# Patient Record
Sex: FEMALE | Race: WHITE | NOT HISPANIC OR LATINO | URBAN - METROPOLITAN AREA
[De-identification: names, ages, dates, MRNs, and addresses within clinical notes are randomized per-mention and may not be internally consistent; named-entity substitution may affect disease eponyms.]

---

## 2018-11-05 ENCOUNTER — EMERGENCY (EMERGENCY)
Facility: HOSPITAL | Age: 83
LOS: 1 days | Discharge: HOME | End: 2018-11-05
Attending: EMERGENCY MEDICINE
Payer: COMMERCIAL

## 2018-11-05 VITALS
SYSTOLIC BLOOD PRESSURE: 155 MMHG | TEMPERATURE: 98 F | RESPIRATION RATE: 20 BRPM | DIASTOLIC BLOOD PRESSURE: 71 MMHG | HEART RATE: 85 BPM | OXYGEN SATURATION: 97 %

## 2018-11-05 VITALS
WEIGHT: 125 LBS | OXYGEN SATURATION: 96 % | HEIGHT: 63 IN | TEMPERATURE: 98 F | SYSTOLIC BLOOD PRESSURE: 182 MMHG | HEART RATE: 110 BPM | DIASTOLIC BLOOD PRESSURE: 84 MMHG | RESPIRATION RATE: 20 BRPM

## 2018-11-05 LAB
ALBUMIN SERPL ELPH-MCNC: 4.2 G/DL — SIGNIFICANT CHANGE UP (ref 3.3–5.2)
ALP SERPL-CCNC: 59 U/L — SIGNIFICANT CHANGE UP (ref 40–120)
ALT FLD-CCNC: 10 U/L — SIGNIFICANT CHANGE UP
ANION GAP SERPL CALC-SCNC: 14 MMOL/L — SIGNIFICANT CHANGE UP (ref 5–17)
APPEARANCE UR: CLEAR — SIGNIFICANT CHANGE UP
AST SERPL-CCNC: 20 U/L — SIGNIFICANT CHANGE UP
BASOPHILS # BLD AUTO: 0 K/UL — SIGNIFICANT CHANGE UP (ref 0–0.2)
BASOPHILS NFR BLD AUTO: 0.5 % — SIGNIFICANT CHANGE UP (ref 0–2)
BILIRUB SERPL-MCNC: 0.5 MG/DL — SIGNIFICANT CHANGE UP (ref 0.4–2)
BILIRUB UR-MCNC: NEGATIVE — SIGNIFICANT CHANGE UP
BUN SERPL-MCNC: 20 MG/DL — SIGNIFICANT CHANGE UP (ref 8–20)
CALCIUM SERPL-MCNC: 10.5 MG/DL — HIGH (ref 8.6–10.2)
CHLORIDE SERPL-SCNC: 103 MMOL/L — SIGNIFICANT CHANGE UP (ref 98–107)
CO2 SERPL-SCNC: 27 MMOL/L — SIGNIFICANT CHANGE UP (ref 22–29)
COLOR SPEC: YELLOW — SIGNIFICANT CHANGE UP
CREAT SERPL-MCNC: 1.26 MG/DL — SIGNIFICANT CHANGE UP (ref 0.5–1.3)
DIFF PNL FLD: NEGATIVE — SIGNIFICANT CHANGE UP
EOSINOPHIL # BLD AUTO: 0 K/UL — SIGNIFICANT CHANGE UP (ref 0–0.5)
EOSINOPHIL NFR BLD AUTO: 0.5 % — SIGNIFICANT CHANGE UP (ref 0–6)
GLUCOSE SERPL-MCNC: 135 MG/DL — HIGH (ref 70–115)
GLUCOSE UR QL: NEGATIVE MG/DL — SIGNIFICANT CHANGE UP
HCT VFR BLD CALC: 37.8 % — SIGNIFICANT CHANGE UP (ref 37–47)
HGB BLD-MCNC: 12.8 G/DL — SIGNIFICANT CHANGE UP (ref 12–16)
KETONES UR-MCNC: NEGATIVE — SIGNIFICANT CHANGE UP
LEUKOCYTE ESTERASE UR-ACNC: NEGATIVE — SIGNIFICANT CHANGE UP
LYMPHOCYTES # BLD AUTO: 1.7 K/UL — SIGNIFICANT CHANGE UP (ref 1–4.8)
LYMPHOCYTES # BLD AUTO: 31.5 % — SIGNIFICANT CHANGE UP (ref 20–55)
MCHC RBC-ENTMCNC: 31.3 PG — HIGH (ref 27–31)
MCHC RBC-ENTMCNC: 33.9 G/DL — SIGNIFICANT CHANGE UP (ref 32–36)
MCV RBC AUTO: 92.4 FL — SIGNIFICANT CHANGE UP (ref 81–99)
MONOCYTES # BLD AUTO: 0.4 K/UL — SIGNIFICANT CHANGE UP (ref 0–0.8)
MONOCYTES NFR BLD AUTO: 8.2 % — SIGNIFICANT CHANGE UP (ref 3–10)
NEUTROPHILS # BLD AUTO: 3.2 K/UL — SIGNIFICANT CHANGE UP (ref 1.8–8)
NEUTROPHILS NFR BLD AUTO: 59.1 % — SIGNIFICANT CHANGE UP (ref 37–73)
NITRITE UR-MCNC: NEGATIVE — SIGNIFICANT CHANGE UP
PH UR: 8 — SIGNIFICANT CHANGE UP (ref 5–8)
PLATELET # BLD AUTO: 245 K/UL — SIGNIFICANT CHANGE UP (ref 150–400)
POTASSIUM SERPL-MCNC: 4.6 MMOL/L — SIGNIFICANT CHANGE UP (ref 3.5–5.3)
POTASSIUM SERPL-SCNC: 4.6 MMOL/L — SIGNIFICANT CHANGE UP (ref 3.5–5.3)
PROT SERPL-MCNC: 8 G/DL — SIGNIFICANT CHANGE UP (ref 6.6–8.7)
PROT UR-MCNC: 15 MG/DL
RBC # BLD: 4.09 M/UL — LOW (ref 4.4–5.2)
RBC # FLD: 13.7 % — SIGNIFICANT CHANGE UP (ref 11–15.6)
SODIUM SERPL-SCNC: 144 MMOL/L — SIGNIFICANT CHANGE UP (ref 135–145)
SP GR SPEC: 1.01 — SIGNIFICANT CHANGE UP (ref 1.01–1.02)
UROBILINOGEN FLD QL: NEGATIVE MG/DL — SIGNIFICANT CHANGE UP
WBC # BLD: 5.5 K/UL — SIGNIFICANT CHANGE UP (ref 4.8–10.8)
WBC # FLD AUTO: 5.5 K/UL — SIGNIFICANT CHANGE UP (ref 4.8–10.8)

## 2018-11-05 PROCEDURE — 36415 COLL VENOUS BLD VENIPUNCTURE: CPT

## 2018-11-05 PROCEDURE — 85027 COMPLETE CBC AUTOMATED: CPT

## 2018-11-05 PROCEDURE — 93005 ELECTROCARDIOGRAM TRACING: CPT

## 2018-11-05 PROCEDURE — 80053 COMPREHEN METABOLIC PANEL: CPT

## 2018-11-05 PROCEDURE — 70450 CT HEAD/BRAIN W/O DYE: CPT | Mod: 26

## 2018-11-05 PROCEDURE — 70450 CT HEAD/BRAIN W/O DYE: CPT

## 2018-11-05 PROCEDURE — 93010 ELECTROCARDIOGRAM REPORT: CPT

## 2018-11-05 PROCEDURE — 81001 URINALYSIS AUTO W/SCOPE: CPT

## 2018-11-05 PROCEDURE — 99284 EMERGENCY DEPT VISIT MOD MDM: CPT | Mod: 25

## 2018-11-05 PROCEDURE — 99284 EMERGENCY DEPT VISIT MOD MDM: CPT

## 2018-11-05 RX ORDER — MECLIZINE HCL 12.5 MG
25 TABLET ORAL ONCE
Qty: 0 | Refills: 0 | Status: COMPLETED | OUTPATIENT
Start: 2018-11-05 | End: 2018-11-05

## 2018-11-05 RX ORDER — MECLIZINE HCL 12.5 MG
1 TABLET ORAL
Qty: 3 | Refills: 0 | OUTPATIENT
Start: 2018-11-05 | End: 2018-11-07

## 2018-11-05 RX ORDER — LEVOTHYROXINE SODIUM 125 MCG
1 TABLET ORAL
Qty: 0 | Refills: 0 | COMMUNITY

## 2018-11-05 RX ORDER — SODIUM CHLORIDE 9 MG/ML
500 INJECTION INTRAMUSCULAR; INTRAVENOUS; SUBCUTANEOUS ONCE
Qty: 0 | Refills: 0 | Status: COMPLETED | OUTPATIENT
Start: 2018-11-05 | End: 2018-11-05

## 2018-11-05 RX ADMIN — SODIUM CHLORIDE 500 MILLILITER(S): 9 INJECTION INTRAMUSCULAR; INTRAVENOUS; SUBCUTANEOUS at 18:13

## 2018-11-05 RX ADMIN — Medication 25 MILLIGRAM(S): at 14:02

## 2018-11-05 NOTE — ED PROVIDER NOTE - ATTENDING CONTRIBUTION TO CARE
pt comes in c/o decreased hearing right ear   dizziness   ears disimpacted   neck supple   awake alert and oriented x three   CN intact   gait baseline   labs reeval

## 2018-11-05 NOTE — ED PROVIDER NOTE - OBJECTIVE STATEMENT
Pt is a 89 y/o female, PMH significant for vertigo, hypothyroidism, L sided hearing loss. Presents to the ED c/o dizziness x 1 day. Pt states she woke up at 8:00AM this morning with a feeling of dizziness and took a dramamine with minimal relief. Pt states the dizziness is exacerbated with movement and is relived when she is lying down. Pt states she has noticed her hearing has been getting worse in her right ear for the past 10 days. Pt regularly follows with ENT specialist for cerumen removal, next appointment scheduled for this week. Pt states she has had viral illness in the past that have had similar presentation. Pt denies CP, SOB, palpitations, HA, change in vision, fever, chills, LOC, nausea, vomiting, sick contacts, and recent travel.

## 2018-11-05 NOTE — ED ADULT TRIAGE NOTE - CHIEF COMPLAINT QUOTE
89y/o female c/o dizziness. Pt aox4, resp even unlabored, no other complaints at this time. Dr. Jurado at bedside to evaluate

## 2018-11-05 NOTE — ED ADULT NURSE NOTE - OBJECTIVE STATEMENT
89yo female AOx4, in no acute distress. Pt states she woke up with dizziness this morning. Pt reports she loses her balance when standing. Pt reports she has a history of vertigo and has felt this before, but she hasn't been throwing up this time. Per pt family her ear has been clogged for a while.

## 2018-11-05 NOTE — ED ADULT NURSE NOTE - NSIMPLEMENTINTERV_GEN_ALL_ED
Implemented All Fall with Harm Risk Interventions:  [place] to call system. Call bell, personal items and telephone within reach. Instruct patient to call for assistance. Room bathroom lighting operational. Non-slip footwear when patient is off stretcher. Physically safe environment: no spills, clutter or unnecessary equipment. Stretcher in lowest position, wheels locked, appropriate side rails in place. Provide visual cue, wrist band, yellow gown, etc. Monitor gait and stability. Monitor for mental status changes and reorient to person, place, and time. Review medications for side effects contributing to fall risk. Reinforce activity limits and safety measures with patient and family. Provide visual clues: red socks.

## 2018-11-05 NOTE — ED ADULT NURSE NOTE - CHIEF COMPLAINT QUOTE
87y/o female c/o dizziness. Pt aox4, resp even unlabored, no other complaints at this time. Dr. Jurado at bedside to evaluate

## 2018-11-05 NOTE — ED PROVIDER NOTE - PROGRESS NOTE DETAILS
Pt with minimal improvement s/p cerumen removal and PO meclizine. Pt and family still refusing CT head at this time. Will f/u labs and refer to out pt ENT / Neurology if WNL. CT head reviewed " Diffuse cerebral and cerebellar atrophy and microvascular disease consistent with age". Pt ambulating conformably with assistance. Pt given referral for out pt neuro f/u.

## 2018-11-05 NOTE — ED PROVIDER NOTE - MEDICAL DECISION MAKING DETAILS
Dizziness x 1 day. EKG, labs, IVF, meclizine, cerumen disimpaction, reassess. PT and family REFUSING CT head at this time (risks explained at length).

## 2018-11-05 NOTE — ED PROVIDER NOTE - PHYSICAL EXAMINATION
HENT: Cerumen covering R tympanic membrane. No erythema, discharge, tragus tenderness.   Neuro: Nonfocal, PERRLA, EOMI, no nystagmus.   MKS: Full ROM upper and lower extremities. Strength equal b/l upper and lower extremities.

## 2023-06-08 PROBLEM — S72.009A FRACTURE OF UNSPECIFIED PART OF NECK OF UNSPECIFIED FEMUR, INITIAL ENCOUNTER FOR CLOSED FRACTURE: Chronic | Status: ACTIVE | Noted: 2018-11-05

## 2023-06-08 PROBLEM — R42 DIZZINESS AND GIDDINESS: Chronic | Status: ACTIVE | Noted: 2018-11-05

## 2023-07-19 ENCOUNTER — APPOINTMENT (OUTPATIENT)
Dept: FAMILY MEDICINE | Facility: CLINIC | Age: 88
End: 2023-07-19
Payer: MEDICARE

## 2023-07-19 VITALS
SYSTOLIC BLOOD PRESSURE: 148 MMHG | OXYGEN SATURATION: 98 % | HEIGHT: 63 IN | BODY MASS INDEX: 23.57 KG/M2 | WEIGHT: 133 LBS | TEMPERATURE: 97.8 F | DIASTOLIC BLOOD PRESSURE: 80 MMHG | HEART RATE: 85 BPM | RESPIRATION RATE: 15 BRPM

## 2023-07-19 VITALS
OXYGEN SATURATION: 98 % | BODY MASS INDEX: 23.57 KG/M2 | RESPIRATION RATE: 15 BRPM | HEART RATE: 85 BPM | TEMPERATURE: 97.6 F | HEIGHT: 63 IN | WEIGHT: 133 LBS

## 2023-07-19 DIAGNOSIS — Z96.642 PRESENCE OF LEFT ARTIFICIAL HIP JOINT: ICD-10-CM

## 2023-07-19 DIAGNOSIS — Z00.00 ENCOUNTER FOR GENERAL ADULT MEDICAL EXAMINATION W/OUT ABNORMAL FINDINGS: ICD-10-CM

## 2023-07-19 PROCEDURE — 99387 INIT PM E/M NEW PAT 65+ YRS: CPT | Mod: 25

## 2023-07-19 PROCEDURE — 36415 COLL VENOUS BLD VENIPUNCTURE: CPT

## 2023-07-19 NOTE — COUNSELING
[No throw rugs] : No throw rugs [Use proper foot wear] : Use proper foot wear [Engage in a relaxing activity] : Engage in a relaxing activity [Good understanding] : Patient has a good understanding of lifestyle changes and steps needed to achieve self management goal [de-identified] : hearing issues vision issues

## 2023-07-19 NOTE — HISTORY OF PRESENT ILLNESS
[Family Member] : family member [FreeTextEntry1] : 89 year old New patient here for a CPE Patient had change of PCP Dr Ortega  did not bring old records  [de-identified] : 89 year old new patient here for a CPE, no complaints, had macular degeneration and hearing loss.

## 2023-07-19 NOTE — HEALTH RISK ASSESSMENT
[Good] : ~his/her~  mood as  good [No] : No [No falls in past year] : Patient reported no falls in the past year [0] : 2) Feeling down, depressed, or hopeless: Not at all (0) [PHQ-2 Positive] : PHQ-2 Positive [Patient declined mammogram] : Patient declined mammogram [Patient declined PAP Smear] : Patient declined PAP Smear [Patient declined bone density test] : Patient declined bone density test [Patient declined colonoscopy] : Patient declined colonoscopy [None] : None [With Family] : lives with family [College] : College [Feels Safe at Home] : Feels safe at home [Fully functional (bathing, dressing, toileting, transferring, walking, feeding)] : Fully functional (bathing, dressing, toileting, transferring, walking, feeding) [Fully functional (using the telephone, shopping, preparing meals, housekeeping, doing laundry, using] : Fully functional and needs no help or supervision to perform IADLs (using the telephone, shopping, preparing meals, housekeeping, doing laundry, using transportation, managing medications and managing finances) [Reports changes in hearing] : Reports changes in hearing [Reports changes in vision] : Reports changes in vision [Reports changes in dental health] : Reports changes in dental health [Smoke Detector] : smoke detector [Safety elements used in home] : safety elements used in home [Never] : Never [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

## 2023-07-19 NOTE — PLAN
[FreeTextEntry1] : 89 year old New patient here for a CPE Patient had change of PCP Dr Ortega  did not bring old records \par \par Declines cardio\par May see optho\par Labs\par EKG declined\par Advised old records\par RTC1 month

## 2023-07-20 LAB
ALBUMIN SERPL ELPH-MCNC: 4.5 G/DL
ALP BLD-CCNC: 76 U/L
ALT SERPL-CCNC: 7 U/L
ANION GAP SERPL CALC-SCNC: 13 MMOL/L
AST SERPL-CCNC: 11 U/L
BILIRUB SERPL-MCNC: 0.4 MG/DL
BUN SERPL-MCNC: 17 MG/DL
CALCIUM SERPL-MCNC: 9.9 MG/DL
CHLORIDE SERPL-SCNC: 107 MMOL/L
CHOLEST SERPL-MCNC: 243 MG/DL
CO2 SERPL-SCNC: 23 MMOL/L
CREAT SERPL-MCNC: 1.3 MG/DL
EGFR: 38 ML/MIN/1.73M2
GLUCOSE SERPL-MCNC: 104 MG/DL
HDLC SERPL-MCNC: 50 MG/DL
LDLC SERPL CALC-MCNC: 158 MG/DL
NONHDLC SERPL-MCNC: 192 MG/DL
POTASSIUM SERPL-SCNC: 4.5 MMOL/L
PROT SERPL-MCNC: 8 G/DL
SODIUM SERPL-SCNC: 143 MMOL/L
TRIGL SERPL-MCNC: 189 MG/DL

## 2023-08-10 ENCOUNTER — APPOINTMENT (OUTPATIENT)
Dept: OPHTHALMOLOGY | Facility: CLINIC | Age: 88
End: 2023-08-10
Payer: MEDICARE

## 2023-08-10 ENCOUNTER — NON-APPOINTMENT (OUTPATIENT)
Age: 88
End: 2023-08-10

## 2023-08-10 PROCEDURE — 92004 COMPRE OPH EXAM NEW PT 1/>: CPT

## 2023-08-10 PROCEDURE — 76512 OPH US DX B-SCAN: CPT | Mod: LT

## 2023-08-16 ENCOUNTER — APPOINTMENT (OUTPATIENT)
Dept: FAMILY MEDICINE | Facility: CLINIC | Age: 88
End: 2023-08-16

## 2023-09-01 ENCOUNTER — EMERGENCY (EMERGENCY)
Facility: HOSPITAL | Age: 88
LOS: 1 days | Discharge: HOME | End: 2023-09-01
Attending: EMERGENCY MEDICINE
Payer: MEDICARE

## 2023-09-01 VITALS
RESPIRATION RATE: 16 BRPM | OXYGEN SATURATION: 94 % | HEART RATE: 81 BPM | SYSTOLIC BLOOD PRESSURE: 167 MMHG | TEMPERATURE: 98 F | DIASTOLIC BLOOD PRESSURE: 87 MMHG

## 2023-09-01 VITALS
HEIGHT: 63 IN | TEMPERATURE: 98 F | HEART RATE: 92 BPM | RESPIRATION RATE: 20 BRPM | SYSTOLIC BLOOD PRESSURE: 180 MMHG | WEIGHT: 134.92 LBS | OXYGEN SATURATION: 93 % | DIASTOLIC BLOOD PRESSURE: 96 MMHG

## 2023-09-01 LAB
ALBUMIN SERPL ELPH-MCNC: 4.3 G/DL — SIGNIFICANT CHANGE UP (ref 3.3–5.2)
ALP SERPL-CCNC: 67 U/L — SIGNIFICANT CHANGE UP (ref 40–120)
ALT FLD-CCNC: 8 U/L — SIGNIFICANT CHANGE UP
ANION GAP SERPL CALC-SCNC: 17 MMOL/L — SIGNIFICANT CHANGE UP (ref 5–17)
APPEARANCE UR: CLEAR — SIGNIFICANT CHANGE UP
AST SERPL-CCNC: 15 U/L — SIGNIFICANT CHANGE UP
BACTERIA # UR AUTO: ABNORMAL
BILIRUB SERPL-MCNC: 0.3 MG/DL — LOW (ref 0.4–2)
BILIRUB UR-MCNC: NEGATIVE — SIGNIFICANT CHANGE UP
BUN SERPL-MCNC: 22.7 MG/DL — HIGH (ref 8–20)
CALCIUM SERPL-MCNC: 10 MG/DL — SIGNIFICANT CHANGE UP (ref 8.4–10.5)
CHLORIDE SERPL-SCNC: 102 MMOL/L — SIGNIFICANT CHANGE UP (ref 96–108)
CO2 SERPL-SCNC: 20 MMOL/L — LOW (ref 22–29)
COLOR SPEC: YELLOW — SIGNIFICANT CHANGE UP
CREAT SERPL-MCNC: 1.35 MG/DL — HIGH (ref 0.5–1.3)
DIFF PNL FLD: ABNORMAL
EGFR: 37 ML/MIN/1.73M2 — LOW
EPI CELLS # UR: SIGNIFICANT CHANGE UP
GLUCOSE SERPL-MCNC: 105 MG/DL — HIGH (ref 70–99)
GLUCOSE UR QL: NEGATIVE MG/DL — SIGNIFICANT CHANGE UP
HCT VFR BLD CALC: 40 % — SIGNIFICANT CHANGE UP (ref 34.5–45)
HGB BLD-MCNC: 13.3 G/DL — SIGNIFICANT CHANGE UP (ref 11.5–15.5)
KETONES UR-MCNC: ABNORMAL
LEUKOCYTE ESTERASE UR-ACNC: ABNORMAL
LIDOCAIN IGE QN: 83 U/L — HIGH (ref 22–51)
MCHC RBC-ENTMCNC: 30.2 PG — SIGNIFICANT CHANGE UP (ref 27–34)
MCHC RBC-ENTMCNC: 33.3 GM/DL — SIGNIFICANT CHANGE UP (ref 32–36)
MCV RBC AUTO: 90.7 FL — SIGNIFICANT CHANGE UP (ref 80–100)
NITRITE UR-MCNC: NEGATIVE — SIGNIFICANT CHANGE UP
PH UR: 6 — SIGNIFICANT CHANGE UP (ref 5–8)
PLATELET # BLD AUTO: 263 K/UL — SIGNIFICANT CHANGE UP (ref 150–400)
POTASSIUM SERPL-MCNC: 4 MMOL/L — SIGNIFICANT CHANGE UP (ref 3.5–5.3)
POTASSIUM SERPL-SCNC: 4 MMOL/L — SIGNIFICANT CHANGE UP (ref 3.5–5.3)
PROT SERPL-MCNC: 7.7 G/DL — SIGNIFICANT CHANGE UP (ref 6.6–8.7)
PROT UR-MCNC: 30 MG/DL
RBC # BLD: 4.41 M/UL — SIGNIFICANT CHANGE UP (ref 3.8–5.2)
RBC # FLD: 14.3 % — SIGNIFICANT CHANGE UP (ref 10.3–14.5)
RBC CASTS # UR COMP ASSIST: SIGNIFICANT CHANGE UP /HPF (ref 0–4)
SODIUM SERPL-SCNC: 139 MMOL/L — SIGNIFICANT CHANGE UP (ref 135–145)
SP GR SPEC: 1.01 — SIGNIFICANT CHANGE UP (ref 1.01–1.02)
UROBILINOGEN FLD QL: NEGATIVE MG/DL — SIGNIFICANT CHANGE UP
WBC # BLD: 5.95 K/UL — SIGNIFICANT CHANGE UP (ref 3.8–10.5)
WBC # FLD AUTO: 5.95 K/UL — SIGNIFICANT CHANGE UP (ref 3.8–10.5)
WBC UR QL: ABNORMAL /HPF (ref 0–5)

## 2023-09-01 PROCEDURE — 76705 ECHO EXAM OF ABDOMEN: CPT | Mod: 26

## 2023-09-01 PROCEDURE — 74176 CT ABD & PELVIS W/O CONTRAST: CPT | Mod: MA

## 2023-09-01 PROCEDURE — 99284 EMERGENCY DEPT VISIT MOD MDM: CPT

## 2023-09-01 PROCEDURE — 83690 ASSAY OF LIPASE: CPT

## 2023-09-01 PROCEDURE — 99284 EMERGENCY DEPT VISIT MOD MDM: CPT | Mod: 25

## 2023-09-01 PROCEDURE — 99053 MED SERV 10PM-8AM 24 HR FAC: CPT

## 2023-09-01 PROCEDURE — 85027 COMPLETE CBC AUTOMATED: CPT

## 2023-09-01 PROCEDURE — 81001 URINALYSIS AUTO W/SCOPE: CPT

## 2023-09-01 PROCEDURE — 74176 CT ABD & PELVIS W/O CONTRAST: CPT | Mod: 26,MA

## 2023-09-01 PROCEDURE — 36415 COLL VENOUS BLD VENIPUNCTURE: CPT

## 2023-09-01 PROCEDURE — 80053 COMPREHEN METABOLIC PANEL: CPT

## 2023-09-01 PROCEDURE — 76705 ECHO EXAM OF ABDOMEN: CPT

## 2023-09-01 RX ORDER — CEPHALEXIN 500 MG
1 CAPSULE ORAL
Qty: 21 | Refills: 0
Start: 2023-09-01 | End: 2023-09-07

## 2023-09-01 RX ORDER — CEPHALEXIN 500 MG
500 CAPSULE ORAL ONCE
Refills: 0 | Status: COMPLETED | OUTPATIENT
Start: 2023-09-01 | End: 2023-09-01

## 2023-09-01 RX ADMIN — Medication 500 MILLIGRAM(S): at 12:49

## 2023-09-01 NOTE — ED ADULT NURSE NOTE - OBJECTIVE STATEMENT
patient presents to ED reporting right flank pain that radiates to RLQ abdomen/ patient reports pain began a week ago but was worse yesterday. patient alert and oriented x3 breathing even and unlabored. patient denies dysuria, hematuria, n/v/d, fever, chills, chest pain, SOB, reports no difficulty w/ BM.

## 2023-09-01 NOTE — ED PROVIDER NOTE - OBJECTIVE STATEMENT
Ms. Nicole Perez is a 89-year-old female past medical history of hypothyroidism on Synthroid comes to the ED with 2-day history of right flank pain with radiation to her right upper abdomen abdomen.  Patient notes positive nausea and vomiting;   Patient brought in by ambulance patient concerned that she might have a renal stone.  Patient denies any fever here dysuria frequency or diarrhea at this time.  Patient notes no change in her diet at this time.  Patient is hard of hearing.

## 2023-09-01 NOTE — ED PROVIDER NOTE - CLINICAL SUMMARY MEDICAL DECISION MAKING FREE TEXT BOX
h/o hypothyroid wit rt flank pain eval gallbladder vs renal stone;  ct renal , us rt upper quad;  uti, pyelonephritis

## 2023-09-01 NOTE — ED PROVIDER NOTE - PATIENT PORTAL LINK FT
You can access the FollowMyHealth Patient Portal offered by [facility] by registering at the following website: [email or web address]. By joining [facility]’s FollowMyHealth portal, you will also be able to view your health information using other applications (apps) compatible with our system.

## 2023-09-01 NOTE — ED ADULT NURSE NOTE - NSFALLHARMRISKINTERV_ED_ALL_ED

## 2023-09-01 NOTE — ED PROVIDER NOTE - ATTENDING CONTRIBUTION TO CARE
I, Oumar Glalardo, performed the initial face to face bedside interview with this patient regarding history of present illness, review of symptoms and relevant past medical, social and family history.  I completed an independent physical examination.  I was the initial provider who evaluated this patient. I have signed out the follow up of any pending tests (i.e. labs, radiological studies) to the resident.  I have communicated the patient’s plan of care and disposition with the resident.

## 2023-09-01 NOTE — ED ADULT TRIAGE NOTE - CHIEF COMPLAINT QUOTE
" I have this pain in my back on the right side and I fel like I gained weight when this problem started" pt states she also felt nauseas today, flank pain started about 1 week ago. pt denies any problems urinating,

## 2023-09-07 ENCOUNTER — OFFICE (OUTPATIENT)
Dept: URBAN - METROPOLITAN AREA CLINIC 115 | Facility: CLINIC | Age: 88
Setting detail: OPHTHALMOLOGY
End: 2023-09-07
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
## 2023-09-07 PROCEDURE — NO SHOW FE NO SHOW FEE: Performed by: OPHTHALMOLOGY

## 2023-09-12 ENCOUNTER — APPOINTMENT (OUTPATIENT)
Dept: FAMILY MEDICINE | Facility: CLINIC | Age: 88
End: 2023-09-12
Payer: MEDICARE

## 2023-09-12 VITALS
HEART RATE: 89 BPM | WEIGHT: 134 LBS | DIASTOLIC BLOOD PRESSURE: 82 MMHG | SYSTOLIC BLOOD PRESSURE: 158 MMHG | OXYGEN SATURATION: 97 % | BODY MASS INDEX: 23.74 KG/M2 | HEIGHT: 63 IN | RESPIRATION RATE: 15 BRPM | TEMPERATURE: 97.8 F

## 2023-09-12 DIAGNOSIS — E03.9 HYPOTHYROIDISM, UNSPECIFIED: ICD-10-CM

## 2023-09-12 DIAGNOSIS — R60.0 LOCALIZED EDEMA: ICD-10-CM

## 2023-09-12 DIAGNOSIS — H35.30 UNSPECIFIED MACULAR DEGENERATION: ICD-10-CM

## 2023-09-12 DIAGNOSIS — M79.652 PAIN IN LEFT THIGH: ICD-10-CM

## 2023-09-12 DIAGNOSIS — H91.93 UNSPECIFIED HEARING LOSS, BILATERAL: ICD-10-CM

## 2023-09-12 DIAGNOSIS — N39.0 URINARY TRACT INFECTION, SITE NOT SPECIFIED: ICD-10-CM

## 2023-09-12 DIAGNOSIS — M25.559 PAIN IN UNSPECIFIED HIP: ICD-10-CM

## 2023-09-12 PROCEDURE — 99214 OFFICE O/P EST MOD 30 MIN: CPT

## 2023-09-13 PROBLEM — H91.93 BILATERAL HEARING LOSS, UNSPECIFIED HEARING LOSS TYPE: Status: ACTIVE | Noted: 2023-07-19

## 2023-09-13 PROBLEM — N39.0 ACUTE UTI: Status: ACTIVE | Noted: 2023-09-06 | Resolved: 2023-10-06

## 2023-12-13 ENCOUNTER — APPOINTMENT (OUTPATIENT)
Dept: FAMILY MEDICINE | Facility: CLINIC | Age: 88
End: 2023-12-13

## 2025-01-24 ENCOUNTER — INPATIENT (INPATIENT)
Facility: HOSPITAL | Age: 89
LOS: 5 days | Discharge: HOME | DRG: 556 | End: 2025-01-30
Attending: INTERNAL MEDICINE | Admitting: STUDENT IN AN ORGANIZED HEALTH CARE EDUCATION/TRAINING PROGRAM
Payer: MEDICARE

## 2025-01-24 VITALS
OXYGEN SATURATION: 99 % | SYSTOLIC BLOOD PRESSURE: 180 MMHG | WEIGHT: 176.37 LBS | RESPIRATION RATE: 18 BRPM | TEMPERATURE: 98 F | DIASTOLIC BLOOD PRESSURE: 100 MMHG | HEART RATE: 107 BPM

## 2025-01-24 DIAGNOSIS — R26.2 DIFFICULTY IN WALKING, NOT ELSEWHERE CLASSIFIED: ICD-10-CM

## 2025-01-24 LAB
ALBUMIN SERPL ELPH-MCNC: 4 G/DL — SIGNIFICANT CHANGE UP (ref 3.3–5.2)
ALP SERPL-CCNC: 68 U/L — SIGNIFICANT CHANGE UP (ref 40–120)
ALT FLD-CCNC: 14 U/L — SIGNIFICANT CHANGE UP
ANION GAP SERPL CALC-SCNC: 13 MMOL/L — SIGNIFICANT CHANGE UP (ref 5–17)
APPEARANCE UR: CLEAR — SIGNIFICANT CHANGE UP
APTT BLD: 33.7 SEC — SIGNIFICANT CHANGE UP (ref 24.5–35.6)
AST SERPL-CCNC: 30 U/L — SIGNIFICANT CHANGE UP
BACTERIA # UR AUTO: NEGATIVE /HPF — SIGNIFICANT CHANGE UP
BASOPHILS # BLD AUTO: 0.05 K/UL — SIGNIFICANT CHANGE UP (ref 0–0.2)
BASOPHILS NFR BLD AUTO: 0.7 % — SIGNIFICANT CHANGE UP (ref 0–2)
BILIRUB SERPL-MCNC: 0.4 MG/DL — SIGNIFICANT CHANGE UP (ref 0.4–2)
BILIRUB UR-MCNC: NEGATIVE — SIGNIFICANT CHANGE UP
BUN SERPL-MCNC: 17.3 MG/DL — SIGNIFICANT CHANGE UP (ref 8–20)
CALCIUM SERPL-MCNC: 9.1 MG/DL — SIGNIFICANT CHANGE UP (ref 8.4–10.5)
CAST: 0 /LPF — SIGNIFICANT CHANGE UP (ref 0–4)
CHLORIDE SERPL-SCNC: 100 MMOL/L — SIGNIFICANT CHANGE UP (ref 96–108)
CK MB CFR SERPL CALC: 4.2 NG/ML — SIGNIFICANT CHANGE UP (ref 0–6.7)
CK SERPL-CCNC: 469 U/L — HIGH (ref 25–170)
CO2 SERPL-SCNC: 23 MMOL/L — SIGNIFICANT CHANGE UP (ref 22–29)
COLOR SPEC: YELLOW — SIGNIFICANT CHANGE UP
CREAT SERPL-MCNC: 1.35 MG/DL — HIGH (ref 0.5–1.3)
DIFF PNL FLD: NEGATIVE — SIGNIFICANT CHANGE UP
EGFR: 36 ML/MIN/1.73M2 — LOW
EOSINOPHIL # BLD AUTO: 0.01 K/UL — SIGNIFICANT CHANGE UP (ref 0–0.5)
EOSINOPHIL NFR BLD AUTO: 0.1 % — SIGNIFICANT CHANGE UP (ref 0–6)
FLUAV AG NPH QL: DETECTED
FLUBV AG NPH QL: SIGNIFICANT CHANGE UP
GLUCOSE SERPL-MCNC: 115 MG/DL — HIGH (ref 70–99)
GLUCOSE UR QL: NEGATIVE MG/DL — SIGNIFICANT CHANGE UP
HCT VFR BLD CALC: 41.8 % — SIGNIFICANT CHANGE UP (ref 34.5–45)
HGB BLD-MCNC: 13.5 G/DL — SIGNIFICANT CHANGE UP (ref 11.5–15.5)
IMM GRANULOCYTES NFR BLD AUTO: 0.6 % — SIGNIFICANT CHANGE UP (ref 0–0.9)
INR BLD: 0.97 RATIO — SIGNIFICANT CHANGE UP (ref 0.85–1.16)
KETONES UR-MCNC: ABNORMAL MG/DL
LEUKOCYTE ESTERASE UR-ACNC: NEGATIVE — SIGNIFICANT CHANGE UP
LYMPHOCYTES # BLD AUTO: 0.85 K/UL — LOW (ref 1–3.3)
LYMPHOCYTES # BLD AUTO: 11.9 % — LOW (ref 13–44)
MCHC RBC-ENTMCNC: 30.3 PG — SIGNIFICANT CHANGE UP (ref 27–34)
MCHC RBC-ENTMCNC: 32.3 G/DL — SIGNIFICANT CHANGE UP (ref 32–36)
MCV RBC AUTO: 93.9 FL — SIGNIFICANT CHANGE UP (ref 80–100)
MONOCYTES # BLD AUTO: 0.78 K/UL — SIGNIFICANT CHANGE UP (ref 0–0.9)
MONOCYTES NFR BLD AUTO: 10.9 % — SIGNIFICANT CHANGE UP (ref 2–14)
NEUTROPHILS # BLD AUTO: 5.42 K/UL — SIGNIFICANT CHANGE UP (ref 1.8–7.4)
NEUTROPHILS NFR BLD AUTO: 75.8 % — SIGNIFICANT CHANGE UP (ref 43–77)
NITRITE UR-MCNC: NEGATIVE — SIGNIFICANT CHANGE UP
PH UR: 7.5 — SIGNIFICANT CHANGE UP (ref 5–8)
PLATELET # BLD AUTO: 219 K/UL — SIGNIFICANT CHANGE UP (ref 150–400)
POTASSIUM SERPL-MCNC: 3.8 MMOL/L — SIGNIFICANT CHANGE UP (ref 3.5–5.3)
POTASSIUM SERPL-SCNC: 3.8 MMOL/L — SIGNIFICANT CHANGE UP (ref 3.5–5.3)
PROT SERPL-MCNC: 7.8 G/DL — SIGNIFICANT CHANGE UP (ref 6.6–8.7)
PROT UR-MCNC: 100 MG/DL
PROTHROM AB SERPL-ACNC: 11 SEC — SIGNIFICANT CHANGE UP (ref 9.9–13.4)
RBC # BLD: 4.45 M/UL — SIGNIFICANT CHANGE UP (ref 3.8–5.2)
RBC # FLD: 15.2 % — HIGH (ref 10.3–14.5)
RBC CASTS # UR COMP ASSIST: 0 /HPF — SIGNIFICANT CHANGE UP (ref 0–4)
RSV RNA NPH QL NAA+NON-PROBE: SIGNIFICANT CHANGE UP
SARS-COV-2 RNA SPEC QL NAA+PROBE: SIGNIFICANT CHANGE UP
SODIUM SERPL-SCNC: 136 MMOL/L — SIGNIFICANT CHANGE UP (ref 135–145)
SP GR SPEC: 1.02 — SIGNIFICANT CHANGE UP (ref 1–1.03)
SQUAMOUS # UR AUTO: 0 /HPF — SIGNIFICANT CHANGE UP (ref 0–5)
TROPONIN T, HIGH SENSITIVITY RESULT: 14 NG/L — SIGNIFICANT CHANGE UP (ref 0–51)
TSH SERPL-MCNC: 8.92 UIU/ML — HIGH (ref 0.27–4.2)
UROBILINOGEN FLD QL: 0.2 MG/DL — SIGNIFICANT CHANGE UP (ref 0.2–1)
WBC # BLD: 7.15 K/UL — SIGNIFICANT CHANGE UP (ref 3.8–10.5)
WBC # FLD AUTO: 7.15 K/UL — SIGNIFICANT CHANGE UP (ref 3.8–10.5)
WBC UR QL: 1 /HPF — SIGNIFICANT CHANGE UP (ref 0–5)

## 2025-01-24 PROCEDURE — 72131 CT LUMBAR SPINE W/O DYE: CPT | Mod: 26

## 2025-01-24 PROCEDURE — 70450 CT HEAD/BRAIN W/O DYE: CPT | Mod: 26

## 2025-01-24 PROCEDURE — 99223 1ST HOSP IP/OBS HIGH 75: CPT

## 2025-01-24 PROCEDURE — 72125 CT NECK SPINE W/O DYE: CPT | Mod: 26

## 2025-01-24 PROCEDURE — 72190 X-RAY EXAM OF PELVIS: CPT | Mod: 26

## 2025-01-24 PROCEDURE — 71045 X-RAY EXAM CHEST 1 VIEW: CPT | Mod: 26

## 2025-01-24 PROCEDURE — 99053 MED SERV 10PM-8AM 24 HR FAC: CPT

## 2025-01-24 PROCEDURE — 99285 EMERGENCY DEPT VISIT HI MDM: CPT

## 2025-01-24 RX ORDER — BACTERIOSTATIC SODIUM CHLORIDE 0.9 %
1000 VIAL (ML) INJECTION
Refills: 0 | Status: DISCONTINUED | OUTPATIENT
Start: 2025-01-24 | End: 2025-01-27

## 2025-01-24 RX ORDER — LEVOTHYROXINE SODIUM 25 UG/1
75 TABLET ORAL DAILY
Refills: 0 | Status: DISCONTINUED | OUTPATIENT
Start: 2025-01-24 | End: 2025-01-30

## 2025-01-24 RX ORDER — MAGNESIUM, ALUMINUM HYDROXIDE 200-225/5
30 SUSPENSION, ORAL (FINAL DOSE FORM) ORAL EVERY 4 HOURS
Refills: 0 | Status: DISCONTINUED | OUTPATIENT
Start: 2025-01-24 | End: 2025-01-30

## 2025-01-24 RX ORDER — OSELTAMIVIR PHOSPHATE 75 MG/1
30 CAPSULE ORAL ONCE
Refills: 0 | Status: DISCONTINUED | OUTPATIENT
Start: 2025-01-24 | End: 2025-01-24

## 2025-01-24 RX ORDER — OSELTAMIVIR PHOSPHATE 75 MG/1
30 CAPSULE ORAL DAILY
Refills: 0 | Status: COMPLETED | OUTPATIENT
Start: 2025-01-24 | End: 2025-01-29

## 2025-01-24 RX ORDER — BACTERIOSTATIC SODIUM CHLORIDE 0.9 %
1000 VIAL (ML) INJECTION ONCE
Refills: 0 | Status: COMPLETED | OUTPATIENT
Start: 2025-01-24 | End: 2025-01-24

## 2025-01-24 RX ORDER — OSELTAMIVIR PHOSPHATE 75 MG/1
30 CAPSULE ORAL DAILY
Refills: 0 | Status: DISCONTINUED | OUTPATIENT
Start: 2025-01-24 | End: 2025-01-24

## 2025-01-24 RX ORDER — FLUTICASONE PROPIONATE 50 UG/1
1 SPRAY, METERED NASAL
Refills: 0 | Status: DISCONTINUED | OUTPATIENT
Start: 2025-01-24 | End: 2025-01-30

## 2025-01-24 RX ORDER — ACETAMINOPHEN, DIPHENHYDRAMINE HCL, PHENYLEPHRINE HCL 325; 25; 5 MG/1; MG/1; MG/1
3 TABLET ORAL AT BEDTIME
Refills: 0 | Status: DISCONTINUED | OUTPATIENT
Start: 2025-01-24 | End: 2025-01-30

## 2025-01-24 RX ORDER — HEPARIN SODIUM,PORCINE 10000/ML
5000 VIAL (ML) INJECTION EVERY 12 HOURS
Refills: 0 | Status: DISCONTINUED | OUTPATIENT
Start: 2025-01-24 | End: 2025-01-30

## 2025-01-24 RX ORDER — ACETAMINOPHEN 160 MG/5ML
650 SUSPENSION ORAL EVERY 6 HOURS
Refills: 0 | Status: DISCONTINUED | OUTPATIENT
Start: 2025-01-24 | End: 2025-01-30

## 2025-01-24 RX ORDER — ACETAMINOPHEN 160 MG/5ML
1000 SUSPENSION ORAL ONCE
Refills: 0 | Status: COMPLETED | OUTPATIENT
Start: 2025-01-24 | End: 2025-01-24

## 2025-01-24 RX ORDER — BACTERIOSTATIC SODIUM CHLORIDE 0.9 %
1000 VIAL (ML) INJECTION
Refills: 0 | Status: DISCONTINUED | OUTPATIENT
Start: 2025-01-24 | End: 2025-01-24

## 2025-01-24 RX ORDER — ONDANSETRON 4 MG/1
4 TABLET, ORALLY DISINTEGRATING ORAL EVERY 8 HOURS
Refills: 0 | Status: DISCONTINUED | OUTPATIENT
Start: 2025-01-24 | End: 2025-01-30

## 2025-01-24 RX ADMIN — FLUTICASONE PROPIONATE 1 SPRAY(S): 50 SPRAY, METERED NASAL at 20:02

## 2025-01-24 RX ADMIN — ACETAMINOPHEN 400 MILLIGRAM(S): 160 SUSPENSION ORAL at 10:08

## 2025-01-24 RX ADMIN — Medication 1000 MILLILITER(S): at 11:10

## 2025-01-24 RX ADMIN — Medication 5000 UNIT(S): at 19:13

## 2025-01-24 NOTE — ED PROVIDER NOTE - OBJECTIVE STATEMENT
89 y/o F with a PMHx  of hypothyroidism, vertigo presents after fall. States was coughing frequently over the last day, felt dizzy upon standing to go the bathroom, states legs became weak and fell onto back. Was unable to get up after fall, landed next to toilet for approximately 12 hrs. Reports muscle cramps. Ambulates with use of walker normally. Denies Headache, nausea, diarrhea, vomiting, chest pain, palpitation, SOB, denies headstrike, no AC.  Endorses sick contact through son with flu. Patient is hard of hearing.

## 2025-01-24 NOTE — ED PROVIDER NOTE - ATTENDING CONTRIBUTION TO CARE
07-May-2022 18:16 I personally saw the patient with the resident, and completed the key components of the history and physical exam. I then discussed the management plan with the resident.    89 y/o F with PMH hypothyroidism, vertigo presents s/p fall today, has been coughing of late, became weak and fell for about 12 hours. SHe usually ambulates with walker, + sick contact at home.    Chronically ill appearing, dry mucus membranes, patient confused on initial exam, no external signs of head trauma, chest wall stable, pelvis stable, abd soft, NT/ND, no respiratory distress, no LE edema, 2+ symmetrical distal pulses.    CT head, C/S, L/S, CXR, pelvis XR, labs, UA, flu swab - found to be flu A + which is causing patient's inability to ambulate - requires admission.

## 2025-01-24 NOTE — ED PROVIDER NOTE - PHYSICAL EXAMINATION
· CONSTITUTIONAL: In no apparent distress.  · HEENMT: Airway patent,  normal appearing mouth, nose, throat, neck supple with full range of motion, no cervical adenopathy.  · EYES: Pupils equal, round and reactive to light, Extra-ocular movement intact, eyes are clear b/l  · CARDIAC: Regular rate and rhythm, Heart sounds S1 S2 present, no murmurs, rubs or gallops  · RESPIRATORY: No respiratory distress. No stridor, Lungs sounds clear with good aeration bilaterally.  · GASTROINTESTINAL: Abdomen soft, non-tender, non-distended, no rebound, no guarding and no masses. no hepatosplenomegaly.  · MUSCULOSKELETAL: Spine appears normal, movement of extremities grossly intact.  · NEUROLOGICAL: Alert and interactive, no focal deficits  · SKIN: No cyanosis, no pallor, no jaundice, no rash  · PSYCHIATRIC: Normal mood and affect, no apparent risk to self or others  · HEME LYMPH: No pallor,   No splenomegaly

## 2025-01-24 NOTE — H&P ADULT - HISTORY OF PRESENT ILLNESS
89 yo F with pmhx of hypothyroidism and vertigo who presents to [facility] due to fall. Patient endorses that she was sitting on toliet yesterday at 5pm and was trying to get up when her legs gave out and she fell backwards and couldn't get up. She denies any head strike or LOC. She remembers being on floor for 12 hours. She endorses she has been coughing and felt weak for 1 day. She lives with her son who she says is sick with the flu. Endorses hx of back pain due to L4 fracture 10 years. Denies any sob, chest pain, dizziness, headaches, n/v/d/constipation. Hard of hearing     In ED, her vitals stable. Labs showed mild THERESE and elevated CPK >400. Flu positive. CT imaging negative for acute pathology. She was given 1 liter bolus and tylenol. Patient is admitted for THERESE and Rhabdo due to Flu.

## 2025-01-24 NOTE — ED PROVIDER NOTE - PROGRESS NOTE DETAILS
Praveen MUSE: Spoke with daughter An, at [phone] about mother, Daughter confirmed pt is A&Ox4 normally. Relayed test result, clarified with daughter ID of hayden as pt referred to Attending as such, hayden is pt zen. Praveen MUSE: Unable to ambulate due to weakness. Will place PT, will admit for likely BETH placement.

## 2025-01-24 NOTE — ED PROVIDER NOTE - CLINICAL SUMMARY MEDICAL DECISION MAKING FREE TEXT BOX
89 y/o F with a PMHx  of hypothyroidism, vertigo presents after fall.. Denies Headache, nausea, diarrhea, vomiting, chest pain, palpitation, SOB, denies headstrike, no AC.  Endorses sick contact through son with flu. Patient is hard of hearing. labs with positive influenza A, Unable to walk in department. Will admit for PT/ BETH placement.

## 2025-01-24 NOTE — ED ADULT NURSE REASSESSMENT NOTE - NS ED NURSE REASSESS COMMENT FT1
Pt AOx4. Resting comfortably in bed. Pt complains of mild pain on side, denies other complaints at this time. RR even and unlabored. Bed locked and in lowest position.

## 2025-01-24 NOTE — H&P ADULT - ASSESSMENT
89 yo F with pmhx of hypothyroidism and Vertigo presented for weakness and fall. Found to have Flu, THERESE and mild Rhabdo.    Plan:  #Weakness and fall due to Flu  #Rhabdo  - Admit to Medicine/ any bed  - CT imaging negative as above  - S/p I L bolus by ED and tylenol  - Flu positive  - Tamiflu started  - CPK >469  - C/w IVF  - UA negative  - PT eval    #THERESE likely prerenal in setting of dehydration and poor po intake  - Bun/Creatinine 17.3/1.35 (unknown baseline  - IVF  - Monitor BMP    #Hypothyroidism  - TSH 8.92  - C/w levothyoxine 75mcg    #Vertigo  - C/w home meclizine prn    #?Thoracic Aorta Aneurysm  - Seen on xray  - Will need Aortogram however will defer at this time due to THERESE    Dvt PPX: Heparin  Diet: regular    Dispo: Pending PT eval, Improvement in THERESE, and Flu positive. Likely 2-3 days.    D/w patient and ED staff

## 2025-01-24 NOTE — H&P ADULT - NSHPLABSRESULTS_GEN_ALL_CORE
LABS:                        13.5   7.15  )-----------( 219      ( 2025 09:58 )             41.8         136  |  100  |  17.3  ----------------------------<  115[H]  3.8   |  23.0  |  1.35[H]    Ca    9.1      2025 09:58    TPro  7.8  /  Alb  4.0  /  TBili  0.4  /  DBili  x   /  AST  30  /  ALT  14  /  AlkPhos  68      PT/INR - ( 2025 09:58 )   PT: 11.0 sec;   INR: 0.97 ratio         PTT - ( 2025 09:58 )  PTT:33.7 sec  CARDIAC MARKERS ( 2025 09:58 )  x     / x     / x     / x     / 4.2 ng/mL      Urinalysis Basic - ( 2025 12:00 )    Color: Yellow / Appearance: Clear / S.020 / pH: x  Gluc: x / Ketone: Trace mg/dL  / Bili: Negative / Urobili: 0.2 mg/dL   Blood: x / Protein: 100 mg/dL / Nitrite: Negative   Leuk Esterase: Negative / RBC: 0 /HPF / WBC 1 /HPF   Sq Epi: x / Non Sq Epi: 0 /HPF / Bacteria: Negative /HPF    < from: 12 Lead ECG (25 @ 11:09) >    Ventricular Rate 96 BPM    Atrial Rate 96 BPM    P-R Interval 190 ms    QRS Duration 90 ms    Q-T Interval 340 ms    QTC Calculation(Bazett) 429 ms    P Axis 45 degrees    R Axis -20 degrees    T Axis 117 degrees    Diagnosis Line Sinus rhythm withPremature supraventricular complexes  Left ventricular hypertrophy with repolarization abnormality  Inferior infarct , age undetermined  Abnormal ECG    Confirmed by Celina MUSE, Gal[place] ([id number]) on 2025 11:50:30 AM    < end of copied text >    < from: CT Cervical Spine No Cont (25 @ 09:41) >    CT CERVICAL SPINE:  No acute fracture or traumatic subluxation.  No prevertebral soft tissue swelling.  Degenerative changes.  No CT evidence for high-grade spinal canal stenosis.    CT LUMBAR SPINE:  No acute fracture traumatic subluxation.    Similar-appearing chronic marked compression deformities of T11 and L1   and moderate compression deformity of L2. Similar retropulsed bone at the   levels of the superior endplates of T11 and L1.    Multilevel degenerative changes, predominantly mild in degree.    --- End of Report ---      < end of copied text >

## 2025-01-24 NOTE — ED ADULT NURSE NOTE - NSFALLHARMRISKINTERV_ED_ALL_ED
Assistance OOB with selected safe patient handling equipment if applicable/Assistance with ambulation/Communicate risk of Fall with Harm to all staff, patient, and family/Encourage patient to sit up slowly, dangle for a short time, stand at bedside before walking/Monitor gait and stability/Orthostatic vital signs/Provide patient with walking aids/Provide visual cue: red socks, yellow wristband, yellow gown, etc/Reinforce activity limits and safety measures with patient and family/Bed in lowest position, wheels locked, appropriate side rails in place/Call bell, personal items and telephone in reach/Instruct patient to call for assistance before getting out of bed/chair/stretcher/Non-slip footwear applied when patient is off stretcher/[place] to call system/Physically safe environment - no spills, clutter or unnecessary equipment/Purposeful Proactive Rounding/Room/bathroom lighting operational, light cord in reach

## 2025-01-24 NOTE — ED ADULT NURSE NOTE - OBJECTIVE STATEMENT
Pt is a 89y F, AOx4, normally ambulates at home w/walker. Pt had cough x2 days, attempted to go to toilet at indeterminate time this morning, pt felt sudden onset weakness and attempted to get up and fell. Pt denies head strike or LOC, states she fell on her back, uncertain amount of time on floor. Pt denies significant med hx, pt is poor historian. Denies chest pain, SOB, dysuria, constipation, n/v/d, and other sx at this time. Resp even and unlabored. Bed locked and in lowest position.

## 2025-01-24 NOTE — H&P ADULT - NSHPPHYSICALEXAM_GEN_ALL_CORE
Gen : Non toxic appearing, comfortable, NAD, Hard of hearing  HEENT : NCAT, MMM, No cervical lymphadenopathy, no JVD  CVS : S1S2, regular rate and rhythm, no murmurs  Resp : CTA b/l, no rhonchi or wheezes appreciated   Abd : Soft, non distended, non tender, BS +ve   MSK : Low back tenderness on palpation, gait testing deferred due to pain  Neuro : CN II-XII intact, no focal motor or sensory deficits   Psych : Pleasant, no anxiety, normal affect    ICU Vital Signs Last 24 Hrs  T(C): 36.9 (24 Jan 2025 11:18), Max: 36.9 (24 Jan 2025 05:51)  T(F): 98.4 (24 Jan 2025 11:18), Max: 98.4 (24 Jan 2025 05:51)  HR: 99 (24 Jan 2025 11:18) (99 - 107)  BP: 149/87 (24 Jan 2025 11:18) (149/87 - 180/100)  BP(mean): --  ABP: --  ABP(mean): --  RR: 16 (24 Jan 2025 11:18) (16 - 18)  SpO2: 96% (24 Jan 2025 11:18) (96% - 99%)    O2 Parameters below as of 24 Jan 2025 07:21  Patient On (Oxygen Delivery Method): room air

## 2025-01-24 NOTE — PHARMACOTHERAPY INTERVENTION NOTE - INTERVENTION TYPE RECOOMEND
"Dose Adjustment - Renal Dose

## 2025-01-25 LAB
A1C WITH ESTIMATED AVERAGE GLUCOSE RESULT: 5.4 % — SIGNIFICANT CHANGE UP (ref 4–5.6)
ALBUMIN SERPL ELPH-MCNC: 3.6 G/DL — SIGNIFICANT CHANGE UP (ref 3.3–5.2)
ALP SERPL-CCNC: 56 U/L — SIGNIFICANT CHANGE UP (ref 40–120)
ALT FLD-CCNC: 20 U/L — SIGNIFICANT CHANGE UP
ANION GAP SERPL CALC-SCNC: 15 MMOL/L — SIGNIFICANT CHANGE UP (ref 5–17)
AST SERPL-CCNC: 54 U/L — HIGH
BASOPHILS # BLD AUTO: 0.04 K/UL — SIGNIFICANT CHANGE UP (ref 0–0.2)
BASOPHILS NFR BLD AUTO: 0.6 % — SIGNIFICANT CHANGE UP (ref 0–2)
BILIRUB SERPL-MCNC: 0.5 MG/DL — SIGNIFICANT CHANGE UP (ref 0.4–2)
BUN SERPL-MCNC: 19.5 MG/DL — SIGNIFICANT CHANGE UP (ref 8–20)
CALCIUM SERPL-MCNC: 8.5 MG/DL — SIGNIFICANT CHANGE UP (ref 8.4–10.5)
CHLORIDE SERPL-SCNC: 102 MMOL/L — SIGNIFICANT CHANGE UP (ref 96–108)
CHOLEST SERPL-MCNC: 196 MG/DL — SIGNIFICANT CHANGE UP
CO2 SERPL-SCNC: 20 MMOL/L — LOW (ref 22–29)
CREAT SERPL-MCNC: 1.24 MG/DL — SIGNIFICANT CHANGE UP (ref 0.5–1.3)
CULTURE RESULTS: SIGNIFICANT CHANGE UP
EGFR: 40 ML/MIN/1.73M2 — LOW
EOSINOPHIL # BLD AUTO: 0.31 K/UL — SIGNIFICANT CHANGE UP (ref 0–0.5)
EOSINOPHIL NFR BLD AUTO: 4.7 % — SIGNIFICANT CHANGE UP (ref 0–6)
ESTIMATED AVERAGE GLUCOSE: 108 MG/DL — SIGNIFICANT CHANGE UP (ref 68–114)
GLUCOSE SERPL-MCNC: 83 MG/DL — SIGNIFICANT CHANGE UP (ref 70–99)
HCT VFR BLD CALC: 40.2 % — SIGNIFICANT CHANGE UP (ref 34.5–45)
HDLC SERPL-MCNC: 51 MG/DL — SIGNIFICANT CHANGE UP
HGB BLD-MCNC: 13.4 G/DL — SIGNIFICANT CHANGE UP (ref 11.5–15.5)
IMM GRANULOCYTES NFR BLD AUTO: 0.5 % — SIGNIFICANT CHANGE UP (ref 0–0.9)
LIPID PNL WITH DIRECT LDL SERPL: 130 MG/DL — HIGH
LYMPHOCYTES # BLD AUTO: 1.51 K/UL — SIGNIFICANT CHANGE UP (ref 1–3.3)
LYMPHOCYTES # BLD AUTO: 23.1 % — SIGNIFICANT CHANGE UP (ref 13–44)
MCHC RBC-ENTMCNC: 31.4 PG — SIGNIFICANT CHANGE UP (ref 27–34)
MCHC RBC-ENTMCNC: 33.3 G/DL — SIGNIFICANT CHANGE UP (ref 32–36)
MCV RBC AUTO: 94.1 FL — SIGNIFICANT CHANGE UP (ref 80–100)
MONOCYTES # BLD AUTO: 0.9 K/UL — SIGNIFICANT CHANGE UP (ref 0–0.9)
MONOCYTES NFR BLD AUTO: 13.8 % — SIGNIFICANT CHANGE UP (ref 2–14)
NEUTROPHILS # BLD AUTO: 3.74 K/UL — SIGNIFICANT CHANGE UP (ref 1.8–7.4)
NEUTROPHILS NFR BLD AUTO: 57.3 % — SIGNIFICANT CHANGE UP (ref 43–77)
NON HDL CHOLESTEROL: 145 MG/DL — HIGH
PLATELET # BLD AUTO: 195 K/UL — SIGNIFICANT CHANGE UP (ref 150–400)
POTASSIUM SERPL-MCNC: 3.6 MMOL/L — SIGNIFICANT CHANGE UP (ref 3.5–5.3)
POTASSIUM SERPL-SCNC: 3.6 MMOL/L — SIGNIFICANT CHANGE UP (ref 3.5–5.3)
PROT SERPL-MCNC: 7.4 G/DL — SIGNIFICANT CHANGE UP (ref 6.6–8.7)
RBC # BLD: 4.27 M/UL — SIGNIFICANT CHANGE UP (ref 3.8–5.2)
RBC # FLD: 15.4 % — HIGH (ref 10.3–14.5)
SODIUM SERPL-SCNC: 137 MMOL/L — SIGNIFICANT CHANGE UP (ref 135–145)
SPECIMEN SOURCE: SIGNIFICANT CHANGE UP
TRIGL SERPL-MCNC: 85 MG/DL — SIGNIFICANT CHANGE UP
TSH SERPL-MCNC: 5 UIU/ML — HIGH (ref 0.27–4.2)
WBC # BLD: 6.53 K/UL — SIGNIFICANT CHANGE UP (ref 3.8–10.5)
WBC # FLD AUTO: 6.53 K/UL — SIGNIFICANT CHANGE UP (ref 3.8–10.5)

## 2025-01-25 PROCEDURE — 99232 SBSQ HOSP IP/OBS MODERATE 35: CPT

## 2025-01-25 RX ADMIN — FLUTICASONE PROPIONATE 1 SPRAY(S): 50 SPRAY, METERED NASAL at 18:31

## 2025-01-25 RX ADMIN — LEVOTHYROXINE SODIUM 75 MICROGRAM(S): 25 TABLET ORAL at 05:00

## 2025-01-25 RX ADMIN — Medication 70 MILLILITER(S): at 15:40

## 2025-01-25 RX ADMIN — Medication 70 MILLILITER(S): at 00:06

## 2025-01-25 RX ADMIN — FLUTICASONE PROPIONATE 1 SPRAY(S): 50 SPRAY, METERED NASAL at 05:01

## 2025-01-25 RX ADMIN — Medication 5000 UNIT(S): at 05:00

## 2025-01-25 RX ADMIN — Medication 5000 UNIT(S): at 18:29

## 2025-01-25 RX ADMIN — OSELTAMIVIR PHOSPHATE 30 MILLIGRAM(S): 75 CAPSULE ORAL at 13:40

## 2025-01-25 NOTE — PROGRESS NOTE ADULT - SUBJECTIVE AND OBJECTIVE BOX
[facility] Division of Hospital Medicine    SUBJECTIVE / OVERNIGHT EVENTS:  No events    Patient denies chest pain, SOB, abd pain, N/V, fever, chills, dysuria or any other complaints. All remainder ROS negative.     MEDICATIONS  (STANDING):  fluticasone propionate 50 MICROgram(s)/spray Nasal Spray 1 Spray(s) Both Nostrils two times a day  heparin   Injectable 5000 Unit(s) SubCutaneous every 12 hours  levothyroxine 75 MICROGram(s) Oral daily  oseltamivir Suspension 30 milliGRAM(s) Oral daily  sodium chloride 0.9%. 1000 milliLiter(s) (70 mL/Hr) IV Continuous <Continuous>    MEDICATIONS  (PRN):  acetaminophen     Tablet .. 650 milliGRAM(s) Oral every 6 hours PRN Temp greater or equal to 38C (100.4F), Mild Pain (1 - 3)  aluminum hydroxide/magnesium hydroxide/simethicone Suspension 30 milliLiter(s) Oral every 4 hours PRN Dyspepsia  meclizine 12.5 milliGRAM(s) Oral daily PRN Dizziness  melatonin 3 milliGRAM(s) Oral at bedtime PRN Insomnia  ondansetron Injectable 4 milliGRAM(s) IV Push every 8 hours PRN Nausea and/or Vomiting        I&O's Summary      PHYSICAL EXAM:  Vital Signs Last 24 Hrs  T(C): 36.9 (25 Jan 2025 08:47), Max: 37.1 (25 Jan 2025 04:04)  T(F): 98.4 (25 Jan 2025 08:47), Max: 98.8 (25 Jan 2025 04:04)  HR: 85 (25 Jan 2025 08:47) (85 - 94)  BP: 153/75 (25 Jan 2025 08:47) (144/82 - 180/79)  BP(mean): --  RR: 18 (25 Jan 2025 08:47) (17 - 20)  SpO2: 93% (25 Jan 2025 08:47) (93% - 95%)    Parameters below as of 25 Jan 2025 08:47  Patient On (Oxygen Delivery Method): room air            CONSTITUTIONAL: NAD, appears stated age  ENMT: Moist oral mucosa, no pharyngeal injection or exudates; normal dentition  RESPIRATORY: Normal respiratory effort; clear to auscultation bilaterally  CARDIOVASCULAR: Regular rate and rhythm, normal S1 and S2, no murmur/rub/gallop; Peripheral pulses are 2+ bilaterally  ABDOMEN: Nontender to palpation, normoactive bowel sounds, no rebound/guarding;   MUSCLOSKELETAL:  No clubbing or cyanosis of digits; no joint swelling or tenderness to palpation  PSYCH: A+O to person, place, and time; affect appropriate  NEUROLOGY: CN 2-12 are intact and symmetric; no gross sensory deficits;   SKIN: No rashes; no palpable lesions    LABS:                        13.4   6.53  )-----------( 195      ( 25 Jan 2025 05:40 )             40.2     01-25    137  |  102  |  19.5  ----------------------------<  83  3.6   |  20.0[L]  |  1.24    Ca    8.5      25 Jan 2025 05:40    TPro  7.4  /  Alb  3.6  /  TBili  0.5  /  DBili  x   /  AST  54[H]  /  ALT  20  /  AlkPhos  56  01-25    PT/INR - ( 24 Jan 2025 09:58 )   PT: 11.0 sec;   INR: 0.97 ratio         PTT - ( 24 Jan 2025 09:58 )  PTT:33.7 sec  CARDIAC MARKERS ( 24 Jan 2025 09:58 )  x     / x     / x     / x     / 4.2 ng/mL      Urinalysis Basic - ( 25 Jan 2025 05:40 )    Color: x / Appearance: x / SG: x / pH: x  Gluc: 83 mg/dL / Ketone: x  / Bili: x / Urobili: x   Blood: x / Protein: x / Nitrite: x   Leuk Esterase: x / RBC: x / WBC x   Sq Epi: x / Non Sq Epi: x / Bacteria: x        CAPILLARY BLOOD GLUCOSE            RADIOLOGY & ADDITIONAL TESTS:  Results Reviewed:   Imaging Personally Reviewed:  Electrocardiogram Personally Reviewed:

## 2025-01-25 NOTE — PROGRESS NOTE ADULT - ASSESSMENT
89 yo F with pmhx of hypothyroidism and Vertigo presented for weakness and fall. Found to have Flu, THERESE and mild Rhabdo.    Plan:  #Weakness and fall due to Flu  #Rhabdo  - CT imaging negative as above  - S/p I L bolus by ED and tylenol  - Tamiflu   - UA negative  - PT eval    #THERESE likely prerenal in setting of dehydration and poor po intake  - Bun/Creatinine 17.3/1.35 (unknown baseline  Improved    #Hypothyroidism  - TSH 8.92  - C/w levothyoxine 75mcg  Check t3/t4 in am    #Vertigo  - C/w home meclizine prn    #?Thoracic Aorta Aneurysm  - Seen on xray  - Will need Aortogram however will defer at this time due to THERESE    Dvt PPX: Heparin  Diet: regular    Dispo: Pending PT eval  Dispo: 2-3 days

## 2025-01-26 LAB
ANION GAP SERPL CALC-SCNC: 15 MMOL/L — SIGNIFICANT CHANGE UP (ref 5–17)
BASOPHILS # BLD AUTO: 0.04 K/UL — SIGNIFICANT CHANGE UP (ref 0–0.2)
BASOPHILS NFR BLD AUTO: 0.5 % — SIGNIFICANT CHANGE UP (ref 0–2)
BUN SERPL-MCNC: 19.9 MG/DL — SIGNIFICANT CHANGE UP (ref 8–20)
CALCIUM SERPL-MCNC: 7.8 MG/DL — LOW (ref 8.4–10.5)
CHLORIDE SERPL-SCNC: 102 MMOL/L — SIGNIFICANT CHANGE UP (ref 96–108)
CO2 SERPL-SCNC: 16 MMOL/L — LOW (ref 22–29)
CREAT SERPL-MCNC: 0.97 MG/DL — SIGNIFICANT CHANGE UP (ref 0.5–1.3)
EGFR: 54 ML/MIN/1.73M2 — LOW
EOSINOPHIL # BLD AUTO: 0.01 K/UL — SIGNIFICANT CHANGE UP (ref 0–0.5)
EOSINOPHIL NFR BLD AUTO: 0.1 % — SIGNIFICANT CHANGE UP (ref 0–6)
GLUCOSE SERPL-MCNC: 73 MG/DL — SIGNIFICANT CHANGE UP (ref 70–99)
HCT VFR BLD CALC: 41.4 % — SIGNIFICANT CHANGE UP (ref 34.5–45)
HGB BLD-MCNC: 13.4 G/DL — SIGNIFICANT CHANGE UP (ref 11.5–15.5)
IMM GRANULOCYTES NFR BLD AUTO: 0.3 % — SIGNIFICANT CHANGE UP (ref 0–0.9)
LYMPHOCYTES # BLD AUTO: 2.61 K/UL — SIGNIFICANT CHANGE UP (ref 1–3.3)
LYMPHOCYTES # BLD AUTO: 33.1 % — SIGNIFICANT CHANGE UP (ref 13–44)
MAGNESIUM SERPL-MCNC: 1.6 MG/DL — SIGNIFICANT CHANGE UP (ref 1.6–2.6)
MCHC RBC-ENTMCNC: 30.7 PG — SIGNIFICANT CHANGE UP (ref 27–34)
MCHC RBC-ENTMCNC: 32.4 G/DL — SIGNIFICANT CHANGE UP (ref 32–36)
MCV RBC AUTO: 95 FL — SIGNIFICANT CHANGE UP (ref 80–100)
MONOCYTES # BLD AUTO: 1.32 K/UL — HIGH (ref 0–0.9)
MONOCYTES NFR BLD AUTO: 16.7 % — HIGH (ref 2–14)
NEUTROPHILS # BLD AUTO: 3.89 K/UL — SIGNIFICANT CHANGE UP (ref 1.8–7.4)
NEUTROPHILS NFR BLD AUTO: 49.3 % — SIGNIFICANT CHANGE UP (ref 43–77)
PHOSPHATE SERPL-MCNC: 2.2 MG/DL — LOW (ref 2.4–4.7)
PLATELET # BLD AUTO: 159 K/UL — SIGNIFICANT CHANGE UP (ref 150–400)
POTASSIUM SERPL-MCNC: 4 MMOL/L — SIGNIFICANT CHANGE UP (ref 3.5–5.3)
POTASSIUM SERPL-SCNC: 4 MMOL/L — SIGNIFICANT CHANGE UP (ref 3.5–5.3)
RBC # BLD: 4.36 M/UL — SIGNIFICANT CHANGE UP (ref 3.8–5.2)
RBC # FLD: 15.2 % — HIGH (ref 10.3–14.5)
SODIUM SERPL-SCNC: 133 MMOL/L — LOW (ref 135–145)
T3 SERPL-MCNC: 57 NG/DL — LOW (ref 80–200)
T4 FREE SERPL-MCNC: 1 NG/DL — SIGNIFICANT CHANGE UP (ref 0.9–1.7)
WBC # BLD: 7.89 K/UL — SIGNIFICANT CHANGE UP (ref 3.8–10.5)
WBC # FLD AUTO: 7.89 K/UL — SIGNIFICANT CHANGE UP (ref 3.8–10.5)

## 2025-01-26 PROCEDURE — 99232 SBSQ HOSP IP/OBS MODERATE 35: CPT

## 2025-01-26 RX ORDER — SODIUM PHOSPHATE, DIBASIC, ANHYDROUS, POTASSIUM PHOSPHATE, MONOBASIC, AND SODIUM PHOSPHATE, MONOBASIC, MONOHYDRATE 852; 155; 130 MG/1; MG/1; MG/1
1 TABLET, COATED ORAL ONCE
Refills: 0 | Status: COMPLETED | OUTPATIENT
Start: 2025-01-26 | End: 2025-01-26

## 2025-01-26 RX ORDER — IBUPROFEN 200 MG
1 CAPSULE ORAL ONCE
Refills: 0 | Status: COMPLETED | OUTPATIENT
Start: 2025-01-26 | End: 2025-01-26

## 2025-01-26 RX ADMIN — FLUTICASONE PROPIONATE 1 SPRAY(S): 50 SPRAY, METERED NASAL at 05:58

## 2025-01-26 RX ADMIN — Medication 5000 UNIT(S): at 17:41

## 2025-01-26 RX ADMIN — Medication 70 MILLILITER(S): at 21:26

## 2025-01-26 RX ADMIN — Medication 70 MILLILITER(S): at 17:41

## 2025-01-26 RX ADMIN — SODIUM PHOSPHATE, DIBASIC, ANHYDROUS, POTASSIUM PHOSPHATE, MONOBASIC, AND SODIUM PHOSPHATE, MONOBASIC, MONOHYDRATE 1 PACKET(S): 852; 155; 130 TABLET, COATED ORAL at 13:19

## 2025-01-26 RX ADMIN — Medication 5000 UNIT(S): at 05:59

## 2025-01-26 RX ADMIN — FLUTICASONE PROPIONATE 1 SPRAY(S): 50 SPRAY, METERED NASAL at 17:41

## 2025-01-26 RX ADMIN — LEVOTHYROXINE SODIUM 75 MICROGRAM(S): 25 TABLET ORAL at 05:59

## 2025-01-26 RX ADMIN — OSELTAMIVIR PHOSPHATE 30 MILLIGRAM(S): 75 CAPSULE ORAL at 17:41

## 2025-01-26 RX ADMIN — Medication 1 TABLET(S): at 13:18

## 2025-01-26 NOTE — PROGRESS NOTE ADULT - ASSESSMENT
89 yo F with pmhx of hypothyroidism and Vertigo presented for weakness and fall. Found to have Flu, THERESE and mild Rhabdo.    Plan:  #Weakness and fall due to Flu  #Rhabdo  - CT imaging negative as above  - S/p I L bolus by ED and tylenol  - Tamiflu   - UA negative  - f/u PT eval    #THERESE likely prerenal in setting of dehydration and poor po intake  - Bun/Creatinine 17.3/1.35 (unknown baseline  Improved    #Hypothyroidism  - TSH 8.92  - C/w levothyoxine 75mcg  - t3 57  - f/u t4, pending results    #Vertigo  - C/w home meclizine prn    #?Thoracic Aorta Aneurysm  - Seen on xray  - Will need Aortogram however will defer at this time due to THERESE    Dvt PPX: Heparin  Diet: regular    Dispo: Pending PT eval  Dispo: likely tomorrow

## 2025-01-26 NOTE — PROGRESS NOTE ADULT - SUBJECTIVE AND OBJECTIVE BOX
SUBJECTIVE    LAST 24 HOURS:  TODAY:  Pt seen at bedside in AM  No acute/overnight events  No acute medical complaints    OBJECTIVE    PHYSICAL EXAM:  GENERAL: No acute distress, comfortably in bed. elderly female  HEENT: Atraumatic, normocephalic, non-icteric  NEURO: A&Ox3, no focal deficits, moving all extremities spontaneously, no dysarthria, CN II-XII grossly intact  PSYCH: Normal affect, calm, appropriate insight and judgment, fluent speech  LUNGS: CTAB, no wrr, non-labored breathing  HEART: RRR, no murmur appreciated  ABD: Soft, non-tender, non-distended, no organomegaly, no appreciable masses, +bs all 4 quadrants  EXTREMITIES: Nontender, no clubbing, cyanosis, or edema    Vital Signs Last 24 Hrs  T(C): 36.8 (26 Jan 2025 08:38), Max: 37 (25 Jan 2025 17:01)  T(F): 98.2 (26 Jan 2025 08:38), Max: 98.6 (25 Jan 2025 17:01)  HR: 70 (26 Jan 2025 08:38) (69 - 79)  BP: 160/79 (26 Jan 2025 08:38) (157/87 - 164/93)  BP(mean): --  RR: 18 (26 Jan 2025 08:38) (18 - 18)  SpO2: 91% (26 Jan 2025 08:38) (91% - 92%)    Parameters below as of 26 Jan 2025 08:38  Patient On (Oxygen Delivery Method): room air    MEDICATIONS  (STANDING):  calcium carbonate   1250 mG (OsCal) 1 Tablet(s) Oral once  fluticasone propionate 50 MICROgram(s)/spray Nasal Spray 1 Spray(s) Both Nostrils two times a day  heparin   Injectable 5000 Unit(s) SubCutaneous every 12 hours  levothyroxine 75 MICROGram(s) Oral daily  oseltamivir Suspension 30 milliGRAM(s) Oral daily  potassium phosphate / sodium phosphate Powder (PHOS-NaK) 1 Packet(s) Oral once  sodium chloride 0.9%. 1000 milliLiter(s) (70 mL/Hr) IV Continuous <Continuous>    MEDICATIONS  (PRN):  acetaminophen     Tablet .. 650 milliGRAM(s) Oral every 6 hours PRN Temp greater or equal to 38C (100.4F), Mild Pain (1 - 3)  aluminum hydroxide/magnesium hydroxide/simethicone Suspension 30 milliLiter(s) Oral every 4 hours PRN Dyspepsia  meclizine 12.5 milliGRAM(s) Oral daily PRN Dizziness  melatonin 3 milliGRAM(s) Oral at bedtime PRN Insomnia  ondansetron Injectable 4 milliGRAM(s) IV Push every 8 hours PRN Nausea and/or Vomiting    Allergies    No Known Allergies    Intolerances        LABS:                        13.4   7.89  )-----------( 159      ( 26 Jan 2025 05:12 )             41.4     01-26    133[L]  |  102  |  19.9  ----------------------------<  73  4.0   |  16.0[L]  |  0.97    Ca    7.8[L]      26 Jan 2025 05:12  Phos  2.2     01-26  Mg     1.6     01-26    TPro  7.4  /  Alb  3.6  /  TBili  0.5  /  DBili  x   /  AST  54[H]  /  ALT  20  /  AlkPhos  56  01-25      Urinalysis Basic - ( 26 Jan 2025 05:12 )    Color: x / Appearance: x / SG: x / pH: x  Gluc: 73 mg/dL / Ketone: x  / Bili: x / Urobili: x   Blood: x / Protein: x / Nitrite: x   Leuk Esterase: x / RBC: x / WBC x   Sq Epi: x / Non Sq Epi: x / Bacteria: x      CAPILLARY BLOOD GLUCOSE    CULTURE DATA:    Culture - Urine (collected 01-24-25 @ 12:00)  Source: Clean Catch Clean Catch (Midstream)  Final Report (01-25-25 @ 15:04):    >=3 organisms. Probable collection contamination.        RADIOLOGY & ADDITIONAL TESTS:  No new imaging to review

## 2025-01-27 ENCOUNTER — TRANSCRIPTION ENCOUNTER (OUTPATIENT)
Age: 89
End: 2025-01-27

## 2025-01-27 LAB
ANION GAP SERPL CALC-SCNC: 15 MMOL/L — SIGNIFICANT CHANGE UP (ref 5–17)
BUN SERPL-MCNC: 21.5 MG/DL — HIGH (ref 8–20)
CALCIUM SERPL-MCNC: 8.3 MG/DL — LOW (ref 8.4–10.5)
CHLORIDE SERPL-SCNC: 102 MMOL/L — SIGNIFICANT CHANGE UP (ref 96–108)
CO2 SERPL-SCNC: 17 MMOL/L — LOW (ref 22–29)
CREAT SERPL-MCNC: 1.02 MG/DL — SIGNIFICANT CHANGE UP (ref 0.5–1.3)
EGFR: 51 ML/MIN/1.73M2 — LOW
GLUCOSE SERPL-MCNC: 83 MG/DL — SIGNIFICANT CHANGE UP (ref 70–99)
MAGNESIUM SERPL-MCNC: 1.5 MG/DL — LOW (ref 1.6–2.6)
PHOSPHATE SERPL-MCNC: 2.3 MG/DL — LOW (ref 2.4–4.7)
POTASSIUM SERPL-MCNC: 3.5 MMOL/L — SIGNIFICANT CHANGE UP (ref 3.5–5.3)
POTASSIUM SERPL-SCNC: 3.5 MMOL/L — SIGNIFICANT CHANGE UP (ref 3.5–5.3)
SODIUM SERPL-SCNC: 134 MMOL/L — LOW (ref 135–145)

## 2025-01-27 PROCEDURE — 99232 SBSQ HOSP IP/OBS MODERATE 35: CPT | Mod: GC

## 2025-01-27 RX ORDER — SODIUM PHOSPHATE, DIBASIC, ANHYDROUS, POTASSIUM PHOSPHATE, MONOBASIC, AND SODIUM PHOSPHATE, MONOBASIC, MONOHYDRATE 852; 155; 130 MG/1; MG/1; MG/1
1 TABLET, COATED ORAL ONCE
Refills: 0 | Status: COMPLETED | OUTPATIENT
Start: 2025-01-27 | End: 2025-01-27

## 2025-01-27 RX ORDER — POLYETHYLENE GLYCOL 3350 17 G/17G
17 POWDER, FOR SOLUTION ORAL DAILY
Refills: 0 | Status: DISCONTINUED | OUTPATIENT
Start: 2025-01-27 | End: 2025-01-30

## 2025-01-27 RX ORDER — LEVOTHYROXINE SODIUM 25 UG/1
1 TABLET ORAL
Qty: 0 | Refills: 0 | DISCHARGE
Start: 2025-01-27

## 2025-01-27 RX ORDER — MAGNESIUM SULFATE 0.8 MEQ/ML
2 AMPUL (ML) INJECTION ONCE
Refills: 0 | Status: COMPLETED | OUTPATIENT
Start: 2025-01-27 | End: 2025-01-27

## 2025-01-27 RX ADMIN — Medication 5000 UNIT(S): at 05:16

## 2025-01-27 RX ADMIN — SODIUM PHOSPHATE, DIBASIC, ANHYDROUS, POTASSIUM PHOSPHATE, MONOBASIC, AND SODIUM PHOSPHATE, MONOBASIC, MONOHYDRATE 1 PACKET(S): 852; 155; 130 TABLET, COATED ORAL at 09:14

## 2025-01-27 RX ADMIN — Medication 5000 UNIT(S): at 18:01

## 2025-01-27 RX ADMIN — FLUTICASONE PROPIONATE 1 SPRAY(S): 50 SPRAY, METERED NASAL at 18:01

## 2025-01-27 RX ADMIN — Medication 25 GRAM(S): at 09:14

## 2025-01-27 RX ADMIN — POLYETHYLENE GLYCOL 3350 17 GRAM(S): 17 POWDER, FOR SOLUTION ORAL at 18:01

## 2025-01-27 RX ADMIN — LEVOTHYROXINE SODIUM 75 MICROGRAM(S): 25 TABLET ORAL at 05:16

## 2025-01-27 RX ADMIN — FLUTICASONE PROPIONATE 1 SPRAY(S): 50 SPRAY, METERED NASAL at 05:16

## 2025-01-27 RX ADMIN — OSELTAMIVIR PHOSPHATE 30 MILLIGRAM(S): 75 CAPSULE ORAL at 18:01

## 2025-01-27 NOTE — DISCHARGE NOTE PROVIDER - NSDCMRMEDTOKEN_GEN_ALL_CORE_FT
levothyroxine 75 mcg (0.075 mg) oral tablet: 1 tab(s) orally once a day  meclizine 12.5 mg oral tablet: 1 tab(s) orally once a day    amLODIPine 5 mg oral tablet: 1 tab(s) orally once a day  Flonase Allergy Relief 50 mcg/inh nasal spray: 1 spray(s) nasal 2 times a day  levothyroxine 75 mcg (0.075 mg) oral tablet: 1 tab(s) orally once a day  meclizine 12.5 mg oral tablet: 1 tab(s) orally once a day   MiraLax oral powder for reconstitution: 17 gram(s) orally once a day  Pharbetol 325 mg oral tablet: 2 tab(s) orally every 6 hours as needed for Temp greater or equal to 38C (100.4F), Mild Pain (1 - 3)

## 2025-01-27 NOTE — DISCHARGE NOTE PROVIDER - HOSPITAL COURSE
89 yo F with pmhx of hypothyroidism and vertigo who presents to [facility] due to fall 2/2 weakness, no head strike or LOC reported. Patient noted that her son at home was sick with flu. RVP was positive for flu. CT revealed increasing size of thoracic aorta, negative for any other acute findings. Labs showed mild THERESE and elevated CPK >400. Patient given IV fluids and tamiflu. UA and Ucx negative. TSH was elevated, free thyroxine within normal limits. Patient evaluated by PT who recommended ------. Patient is now medically stable for discharge.    Active or Pending Issues Requiring Follow-up:  aortogram for thoracic aortic aneurysm outpt    Discharge Diagnoses:  #Flu  #THERESE  #fall    Patient was explained hospital course, risks and benefits of treatment, and discharge planning, along with follow-up. Patient expresses understanding of all of the above. Patient is medically stable for discharge with appropriate followup. 89 yo F with pmhx of hypothyroidism and vertigo who presents to [facility] due to fall 2/2 weakness, no head strike or LOC reported. Patient noted that her son at home was sick with flu. RVP was positive for flu. CT revealed increasing size of thoracic aorta, negative for any other acute findings. Labs showed mild THERESE and elevated CPK >400. Patient given IV fluids and tamiflu. UA and Ucx negative. TSH was elevated, free thyroxine within normal limits. Patient evaluated by PT who recommended sub acute rehab. Patient is now medically stable for discharge.    Active or Pending Issues Requiring Follow-up:  aortogram for thoracic aortic aneurysm outpt    Discharge Diagnoses:  #Flu  #THERESE  #fall  #thoracic AA    Patient was explained hospital course, risks and benefits of treatment, and discharge planning, along with follow-up. Patient expresses understanding of all of the above. Patient is medically stable for discharge with appropriate followup.

## 2025-01-27 NOTE — DISCHARGE NOTE PROVIDER - NSDCCPCAREPLAN_GEN_ALL_CORE_FT
PRINCIPAL DISCHARGE DIAGNOSIS  Diagnosis: Fall  Assessment and Plan of Treatment: you noted you fell because you were weak. You were flu positive and were treated with tamiflu and given some IV fluids. Imaging didnt show any fractures or traumatic findings. Physical therapy evaluated you. Follow up with your primary care doctor.      SECONDARY DISCHARGE DIAGNOSES  Diagnosis: Influenza A  Assessment and Plan of Treatment:      PRINCIPAL DISCHARGE DIAGNOSIS  Diagnosis: Fall  Assessment and Plan of Treatment: you noted you fell because you were weak. You were flu positive and were treated with tamiflu and given some IV fluids. Imaging didnt show any fractures or traumatic findings. Physical therapy evaluated you. Follow up with your primary care doctor.      SECONDARY DISCHARGE DIAGNOSES  Diagnosis: Influenza A  Assessment and Plan of Treatment: treated with conservative measures and tamiflu    Diagnosis: Thoracic aortic aneurysm  Assessment and Plan of Treatment: We performed imaging while you were admitted and noted that a section of your aorta is more wide than normal. You will need a follow up imaging called an "aortogram" after discharge.     PRINCIPAL DISCHARGE DIAGNOSIS  Diagnosis: Fall  Assessment and Plan of Treatment: you noted you fell because you were weak. You were flu positive and were treated with tamiflu and given some IV fluids. Imaging didnt show any fractures or traumatic findings. Physical therapy evaluated you. Follow up with your primary care doctor.      SECONDARY DISCHARGE DIAGNOSES  Diagnosis: Influenza A  Assessment and Plan of Treatment: treated with conservative measures and tamiflu    Diagnosis: Thoracic aortic aneurysm  Assessment and Plan of Treatment: We performed imaging while you were admitted and noted that a section of your aorta is more wide than normal. You will need a follow up imaging called an "aortogram" after discharge.    Diagnosis: Hypertension  Assessment and Plan of Treatment: continue with amlodipine   low salt diet

## 2025-01-27 NOTE — PHYSICAL THERAPY INITIAL EVALUATION ADULT - ADDITIONAL COMMENTS
Pt reports living with son in a house with 4 ALIRIO c rail, and has 1 step down in den (pt reports does not have to go into den). Pt amb with RW and is independent with functional mobility, ADLs (Sponge bathes) and son can assist if needed). Pt reports not leaving the house for last 2 years. Pt has support of son. Pt owns RW.

## 2025-01-27 NOTE — PROGRESS NOTE ADULT - ASSESSMENT
89 yo F with pmhx of hypothyroidism and Vertigo presented for weakness and fall. Found to have Flu, THERESE and mild Rhabdo.    Plan:  #Weakness and fall due to Flu  #Rhabdo  - CT imaging negative as above  - S/p I L bolus by ED and tylenol  - Tamiflu   - UA negative  - PT eval recommends BETH placement  - Patient encouraged increase PO    #THERESE likely prerenal in setting of dehydration and poor po intake  - Bun/Creatinine 17.3/1.35 (unknown baseline) In ED -->21.5/1.02 Today  - Showing improvement   - patient encouraged increase PO intake    #Hypothyroidism  - TSH 8.92  - C/w levothyroxine 75mcg  - t3 57  - free thyroid 1  - f/u outpatient    #Vertigo  - C/w home meclizine prn    #?Thoracic Aorta Aneurysm  - Seen on xray  - Will need Aortogram however will defer at this time due to THERESE    Dvt PPX: Heparin  Diet: regular    Dispo: Pending BETH  Dispo: likely tomorrow   89 yo F with pmhx of hypothyroidism and Vertigo presented for weakness and fall. Found to have Flu, THERESE and mild Rhabdo.    Plan:  #Weakness and fall due to Flu  #Rhabdo  - CT imaging negative as above  - S/p I L bolus by ED and tylenol  - Tamiflu 5 day course (last date 1/29)  - UA negative  - PT eval recommends BETH placement  - Patient encouraged increase PO    #THERESE likely prerenal in setting of dehydration and poor po intake  - Bun/Creatinine 17.3/1.35 (unknown baseline) In ED -->21.5/1.02 Today  - Showing improvement   - patient encouraged increase PO intake    #Hypothyroidism  - TSH 8.92  - C/w levothyroxine 75mcg  - t3 57  - free thyroid 1  - f/u outpatient    #Vertigo  - C/w home meclizine prn    #?Thoracic Aorta Aneurysm  - Seen on xray  - Will need Aortogram outpt    Dvt PPX: Heparin  Diet: regular    Dispo: Pending BETH  Dispo: likely tomorrow

## 2025-01-27 NOTE — PHYSICAL THERAPY INITIAL EVALUATION ADULT - PERTINENT HX OF CURRENT PROBLEM, REHAB EVAL
89 yo F with pmhx of hypothyroidism and Vertigo presented for weakness and fall. Found to have Flu, THERESE and mild Rhabdo.

## 2025-01-27 NOTE — PROGRESS NOTE ADULT - SUBJECTIVE AND OBJECTIVE BOX
Patient is a 89y old  Female who presents with a chief complaint of Weakness and Fall, Flu+ve (27 Jan 2025 10:22)    SUBJECTIVE:  BRIEF HOSPITAL COURSE: 89 yo F with pmhx of hypothyroidism and vertigo who presents to [facility] due to fall 2/2 weakness, no head strike or LOC reported. Patient noted that her son at home was sick with flu. RVP was positive for flu. CT revealed increasing size of thoracic aorta, negative for any other acute findings. Labs showed mild THERESE and elevated CPK >400. Patient given IV fluids and tamiflu. UA and Ucx negative. TSH was elevated, free thyroxine within normal limits. Patient evaluated by PT who recommended BETH placement. Pending BETH.     OVERNIGHT EVENTS/INTERVAL HPI: No overnight events. Patient evaluated at bedside in the AM. Patient continued on tamiflu, on room air. Patient was encouraged to drink more. Magnesium and Phosph repleted. Patient denies acute complaints. PT evaluated, recommend BETH placement.     MEDICATIONS  (STANDING):  fluticasone propionate 50 MICROgram(s)/spray Nasal Spray 1 Spray(s) Both Nostrils two times a day  heparin   Injectable 5000 Unit(s) SubCutaneous every 12 hours  levothyroxine 75 MICROGram(s) Oral daily  oseltamivir Suspension 30 milliGRAM(s) Oral daily  polyethylene glycol 3350 17 Gram(s) Oral daily    MEDICATIONS  (PRN):  acetaminophen     Tablet .. 650 milliGRAM(s) Oral every 6 hours PRN Temp greater or equal to 38C (100.4F), Mild Pain (1 - 3)  aluminum hydroxide/magnesium hydroxide/simethicone Suspension 30 milliLiter(s) Oral every 4 hours PRN Dyspepsia  meclizine 12.5 milliGRAM(s) Oral daily PRN Dizziness  melatonin 3 milliGRAM(s) Oral at bedtime PRN Insomnia  ondansetron Injectable 4 milliGRAM(s) IV Push every 8 hours PRN Nausea and/or Vomiting      Allergies    No Known Allergies    Intolerances      REVIEW OF SYSTEMS:    CONSTITUTIONAL: No weakness, fevers or chills  EYES/ENT: No visual changes;  No vertigo or throat pain   NECK: No pain or stiffness  RESPIRATORY: No cough, wheezing, hemoptysis; No shortness of breath  CARDIOVASCULAR: No chest pain or palpitations  GASTROINTESTINAL: No abdominal or epigastric pain. No nausea, vomiting, or hematemesis; No diarrhea or constipation. No melena or hematochezia.  GENITOURINARY: No dysuria, frequency or hematuria  NEUROLOGICAL: No numbness or weakness  SKIN: No itching, rashes    OBJECTIVE:  Vital Signs Last 24 Hrs  T(C): 36.6 (27 Jan 2025 08:43), Max: 36.9 (26 Jan 2025 20:53)  T(F): 97.9 (27 Jan 2025 08:43), Max: 98.4 (26 Jan 2025 20:53)  HR: 82 (27 Jan 2025 08:43) (79 - 84)  BP: 155/81 (27 Jan 2025 08:43) (134/77 - 155/85)  BP(mean): --  RR: 18 (27 Jan 2025 08:43) (17 - 18)  SpO2: 92% (27 Jan 2025 08:43) (91% - 94%)    Parameters below as of 27 Jan 2025 08:43  Patient On (Oxygen Delivery Method): room air        PHYSICAL EXAM:  GENERAL: No acute distress, comfortably in bed. elderly female  HEENT: Atraumatic, normocephalic, non-icteric  NEURO: A&Ox3, no focal deficits, moving all extremities spontaneously, no dysarthria, CN II-XII grossly intact  PSYCH: Normal affect, calm, appropriate insight and judgment, fluent speech  LUNGS: CTAB, no wrr, non-labored breathing  HEART: RRR, no murmur appreciated  ABD: Soft, non-tender, non-distended, no organomegaly, no appreciable masses, +bs all 4 quadrants  EXTREMITIES: Nontender, no clubbing, cyanosis, or edema    LABS:                        13.4   7.89  )-----------( 159      ( 26 Jan 2025 05:12 )             41.4     01-27    134[L]  |  102  |  21.5[H]  ----------------------------<  83  3.5   |  17.0[L]  |  1.02    Ca    8.3[L]      27 Jan 2025 04:40  Phos  2.3     01-27  Mg     1.5     01-27        Urinalysis Basic - ( 27 Jan 2025 04:40 )    Color: x / Appearance: x / SG: x / pH: x  Gluc: 83 mg/dL / Ketone: x  / Bili: x / Urobili: x   Blood: x / Protein: x / Nitrite: x   Leuk Esterase: x / RBC: x / WBC x   Sq Epi: x / Non Sq Epi: x / Bacteria: x      Culture - Urine (collected 01-24-25 @ 12:00)  Source: Clean Catch Clean Catch (Midstream)  Final Report (01-25-25 @ 15:04):    >=3 organisms. Probable collection contamination.    CAPILLARY BLOOD GLUCOSE          RADIOLOGY & ADDITIONAL TESTS:  No new imaging to review    Patient is a 89y old  Female who presents with a chief complaint of Weakness and Fall, Flu+ve (27 Jan 2025 10:22)    SUBJECTIVE:  BRIEF HOSPITAL COURSE: 89 yo F with pmhx of hypothyroidism and vertigo who presents to [facility] due to fall 2/2 weakness, no head strike or LOC reported. Patient noted that her son at home was sick with flu. RVP was positive for flu. CT revealed increasing size of thoracic aorta, negative for any other acute findings. Labs showed mild THERESE and elevated CPK >400. Patient given IV fluids and tamiflu. UA and Ucx negative. TSH was elevated, free thyroxine within normal limits. Patient evaluated by PT who recommended BETH placement. Pending BETH auth.    OVERNIGHT EVENTS/INTERVAL HPI: No overnight events. Patient evaluated at bedside in the AM. Patient continued on tamiflu, on room air. Patient was encouraged to drink more. Magnesium and Phosph repleted. Patient denies acute complaints. PT evaluated, recommend BETH placement.     MEDICATIONS  (STANDING):  fluticasone propionate 50 MICROgram(s)/spray Nasal Spray 1 Spray(s) Both Nostrils two times a day  heparin   Injectable 5000 Unit(s) SubCutaneous every 12 hours  levothyroxine 75 MICROGram(s) Oral daily  oseltamivir Suspension 30 milliGRAM(s) Oral daily  polyethylene glycol 3350 17 Gram(s) Oral daily    MEDICATIONS  (PRN):  acetaminophen     Tablet .. 650 milliGRAM(s) Oral every 6 hours PRN Temp greater or equal to 38C (100.4F), Mild Pain (1 - 3)  aluminum hydroxide/magnesium hydroxide/simethicone Suspension 30 milliLiter(s) Oral every 4 hours PRN Dyspepsia  meclizine 12.5 milliGRAM(s) Oral daily PRN Dizziness  melatonin 3 milliGRAM(s) Oral at bedtime PRN Insomnia  ondansetron Injectable 4 milliGRAM(s) IV Push every 8 hours PRN Nausea and/or Vomiting      Allergies    No Known Allergies    Intolerances      REVIEW OF SYSTEMS:    CONSTITUTIONAL: No weakness, fevers or chills  EYES/ENT: No visual changes;  No vertigo or throat pain   NECK: No pain or stiffness  RESPIRATORY: No cough, wheezing, hemoptysis; No shortness of breath  CARDIOVASCULAR: No chest pain or palpitations  GASTROINTESTINAL: No abdominal or epigastric pain. No nausea, vomiting, or hematemesis; No diarrhea or constipation. No melena or hematochezia.  GENITOURINARY: No dysuria, frequency or hematuria  NEUROLOGICAL: No numbness or weakness  SKIN: No itching, rashes    OBJECTIVE:  Vital Signs Last 24 Hrs  T(C): 36.6 (27 Jan 2025 08:43), Max: 36.9 (26 Jan 2025 20:53)  T(F): 97.9 (27 Jan 2025 08:43), Max: 98.4 (26 Jan 2025 20:53)  HR: 82 (27 Jan 2025 08:43) (79 - 84)  BP: 155/81 (27 Jan 2025 08:43) (134/77 - 155/85)  BP(mean): --  RR: 18 (27 Jan 2025 08:43) (17 - 18)  SpO2: 92% (27 Jan 2025 08:43) (91% - 94%)    Parameters below as of 27 Jan 2025 08:43  Patient On (Oxygen Delivery Method): room air        PHYSICAL EXAM:  GENERAL: No acute distress, comfortably in bed. elderly female  HEENT: Atraumatic, normocephalic, non-icteric  NEURO: A&Ox3, no focal deficits, moving all extremities spontaneously, no dysarthria, CN II-XII grossly intact  PSYCH: Normal affect, calm, appropriate insight and judgment, fluent speech  LUNGS: CTAB, no wrr, non-labored breathing  HEART: RRR, no murmur appreciated  ABD: Soft, non-tender, non-distended, no organomegaly, no appreciable masses, +bs all 4 quadrants  EXTREMITIES: Nontender, no clubbing, cyanosis, or edema    LABS:                        13.4   7.89  )-----------( 159      ( 26 Jan 2025 05:12 )             41.4     01-27    134[L]  |  102  |  21.5[H]  ----------------------------<  83  3.5   |  17.0[L]  |  1.02    Ca    8.3[L]      27 Jan 2025 04:40  Phos  2.3     01-27  Mg     1.5     01-27        Urinalysis Basic - ( 27 Jan 2025 04:40 )    Color: x / Appearance: x / SG: x / pH: x  Gluc: 83 mg/dL / Ketone: x  / Bili: x / Urobili: x   Blood: x / Protein: x / Nitrite: x   Leuk Esterase: x / RBC: x / WBC x   Sq Epi: x / Non Sq Epi: x / Bacteria: x      Culture - Urine (collected 01-24-25 @ 12:00)  Source: Clean Catch Clean Catch (Midstream)  Final Report (01-25-25 @ 15:04):    >=3 organisms. Probable collection contamination.    CAPILLARY BLOOD GLUCOSE          RADIOLOGY & ADDITIONAL TESTS:  No new imaging to review

## 2025-01-27 NOTE — DISCHARGE NOTE PROVIDER - ATTENDING DISCHARGE PHYSICAL EXAMINATION:
Vital Signs Last 24 Hrs  T(C): 36.6 (27 Jan 2025 08:43), Max: 36.9 (26 Jan 2025 20:53)  T(F): 97.9 (27 Jan 2025 08:43), Max: 98.4 (26 Jan 2025 20:53)  HR: 82 (27 Jan 2025 08:43) (79 - 84)  BP: 155/81 (27 Jan 2025 08:43) (134/77 - 155/85)  BP(mean): --  RR: 18 (27 Jan 2025 08:43) (17 - 18)  SpO2: 92% (27 Jan 2025 08:43) (91% - 94%)    Parameters below as of 27 Jan 2025 08:43  Patient On (Oxygen Delivery Method): room air    GENERAL: No acute distress, comfortably in bed. elderly female  HEENT: Atraumatic, normocephalic, non-icteric  NEURO: A&Ox3, no focal deficits, moving all extremities spontaneously, no dysarthria, CN II-XII grossly intact  PSYCH: Normal affect, calm, appropriate insight and judgment, fluent speech  LUNGS: CTAB, no wrr, non-labored breathing  HEART: RRR, no murmur appreciated  ABD: Soft, non-tender, non-distended, no organomegaly, no appreciable masses, +bs all 4 quadrants  EXTREMITIES: Nontender, no clubbing, cyanosis, or edema Vital Signs Last 24 Hrs  T(C): 36.5 (30 Jan 2025 04:31), Max: 36.7 (30 Jan 2025 00:39)  T(F): 97.7 (30 Jan 2025 04:31), Max: 98 (30 Jan 2025 00:39)  HR: 83 (30 Jan 2025 04:31) (72 - 83)  BP: 159/79 (30 Jan 2025 04:31) (138/71 - 195/82)  BP(mean): --  RR: 18 (30 Jan 2025 04:31) (17 - 18)  SpO2: 93% (30 Jan 2025 04:31) (93% - 94%)    Parameters below as of 30 Jan 2025 04:31  Patient On (Oxygen Delivery Method): room air      GENERAL: No acute distress, comfortably in bed. elderly female  HEENT: Atraumatic, normocephalic, non-icteric  NEURO: A&Ox3, no focal deficits, moving all extremities spontaneously, no dysarthria, CN II-XII grossly intact  PSYCH: Normal affect, calm, appropriate insight and judgment, fluent speech  LUNGS: CTAB, no wrr, non-labored breathing  HEART: RRR, no murmur appreciated  ABD: Soft, non-tender, non-distended, no organomegaly, no appreciable masses, +bs all 4 quadrants  EXTREMITIES: Nontender, no clubbing, cyanosis, or edema Vital Signs Last 24 Hrs  T(C): 36.5 (30 Jan 2025 04:31), Max: 36.7 (30 Jan 2025 00:39)  T(F): 97.7 (30 Jan 2025 04:31), Max: 98 (30 Jan 2025 00:39)  HR: 83 (30 Jan 2025 04:31) (72 - 83)  BP: 159/79 (30 Jan 2025 04:31) (138/71 - 165/82)  BP(mean): --  RR: 18 (30 Jan 2025 04:31) (17 - 18)  SpO2: 93% (30 Jan 2025 04:31) (93% - 94%)    Parameters below as of 30 Jan 2025 04:31  Patient On (Oxygen Delivery Method): room air      GENERAL: No acute distress, comfortably in bed. elderly female  HEENT: Atraumatic, normocephalic, non-icteric  NEURO: A&Ox3, no focal deficits, moving all extremities spontaneously, no dysarthria, CN II-XII grossly intact  PSYCH: Normal affect, calm, appropriate insight and judgment, fluent speech  LUNGS: CTAB, no wrr, non-labored breathing  HEART: RRR, no murmur appreciated  ABD: Soft, non-tender, non-distended, no organomegaly, no appreciable masses, +bs all 4 quadrants  EXTREMITIES: Nontender, no clubbing, cyanosis, or edema

## 2025-01-28 LAB
ANION GAP SERPL CALC-SCNC: 13 MMOL/L — SIGNIFICANT CHANGE UP (ref 5–17)
BUN SERPL-MCNC: 21.5 MG/DL — HIGH (ref 8–20)
CALCIUM SERPL-MCNC: 8.2 MG/DL — LOW (ref 8.4–10.5)
CHLORIDE SERPL-SCNC: 98 MMOL/L — SIGNIFICANT CHANGE UP (ref 96–108)
CO2 SERPL-SCNC: 22 MMOL/L — SIGNIFICANT CHANGE UP (ref 22–29)
CREAT SERPL-MCNC: 1.03 MG/DL — SIGNIFICANT CHANGE UP (ref 0.5–1.3)
EGFR: 50 ML/MIN/1.73M2 — LOW
GLUCOSE SERPL-MCNC: 105 MG/DL — HIGH (ref 70–99)
MAGNESIUM SERPL-MCNC: 1.9 MG/DL — SIGNIFICANT CHANGE UP (ref 1.6–2.6)
PHOSPHATE SERPL-MCNC: 2 MG/DL — LOW (ref 2.4–4.7)
POTASSIUM SERPL-MCNC: 3.5 MMOL/L — SIGNIFICANT CHANGE UP (ref 3.5–5.3)
POTASSIUM SERPL-SCNC: 3.5 MMOL/L — SIGNIFICANT CHANGE UP (ref 3.5–5.3)
SODIUM SERPL-SCNC: 132 MMOL/L — LOW (ref 135–145)

## 2025-01-28 PROCEDURE — 99232 SBSQ HOSP IP/OBS MODERATE 35: CPT | Mod: GC

## 2025-01-28 RX ORDER — BACTERIOSTATIC SODIUM CHLORIDE 0.9 %
1000 VIAL (ML) INJECTION
Refills: 0 | Status: DISCONTINUED | OUTPATIENT
Start: 2025-01-28 | End: 2025-01-30

## 2025-01-28 RX ORDER — POTASSIUM PHOSPHATE, MONOBASIC POTASSIUM PHOSPHATE, DIBASIC 236; 224 MG/ML; MG/ML
30 INJECTION, SOLUTION INTRAVENOUS ONCE
Refills: 0 | Status: COMPLETED | OUTPATIENT
Start: 2025-01-28 | End: 2025-01-28

## 2025-01-28 RX ADMIN — ACETAMINOPHEN 650 MILLIGRAM(S): 160 SUSPENSION ORAL at 21:41

## 2025-01-28 RX ADMIN — ACETAMINOPHEN 650 MILLIGRAM(S): 160 SUSPENSION ORAL at 10:39

## 2025-01-28 RX ADMIN — FLUTICASONE PROPIONATE 1 SPRAY(S): 50 SPRAY, METERED NASAL at 18:09

## 2025-01-28 RX ADMIN — Medication 5000 UNIT(S): at 05:29

## 2025-01-28 RX ADMIN — Medication 5000 UNIT(S): at 18:09

## 2025-01-28 RX ADMIN — FLUTICASONE PROPIONATE 1 SPRAY(S): 50 SPRAY, METERED NASAL at 05:29

## 2025-01-28 RX ADMIN — ACETAMINOPHEN, DIPHENHYDRAMINE HCL, PHENYLEPHRINE HCL 3 MILLIGRAM(S): 325; 25; 5 TABLET ORAL at 21:41

## 2025-01-28 RX ADMIN — POTASSIUM PHOSPHATE, MONOBASIC POTASSIUM PHOSPHATE, DIBASIC 83.33 MILLIMOLE(S): 236; 224 INJECTION, SOLUTION INTRAVENOUS at 18:10

## 2025-01-28 RX ADMIN — ACETAMINOPHEN 650 MILLIGRAM(S): 160 SUSPENSION ORAL at 22:30

## 2025-01-28 RX ADMIN — LEVOTHYROXINE SODIUM 75 MICROGRAM(S): 25 TABLET ORAL at 05:29

## 2025-01-28 RX ADMIN — OSELTAMIVIR PHOSPHATE 30 MILLIGRAM(S): 75 CAPSULE ORAL at 18:10

## 2025-01-28 NOTE — PROGRESS NOTE ADULT - ASSESSMENT
#Weakness and fall due to Flu  #Rhabdo  - CT imaging negative as above  - S/p I L bolus by ED and tylenol  - Tamiflu 5 day course (last date 1/29)  - UA negative  - PT eval recommends BETH placement  - Patient encouraged increase PO    #THERESE likely prerenal in setting of dehydration and poor po intake  - Bun/Creatinine 17.3/1.35 (unknown baseline) In ED -->21.5/1.02 1/28  - Showing improvement     #Hypothyroidism  - TSH 8.92  - C/w levothyroxine 75mcg  - t3 57  - free thyroid 1  - f/u outpatient    #Vertigo  - C/w home meclizine prn    #?Thoracic Aorta Aneurysm  - Seen on xray  - Will need Aortogram outpt    Dvt PPX: Heparin  Diet: regular    Dispo: Pending BETH  Dispo: likely Thursday 1/30

## 2025-01-28 NOTE — PROGRESS NOTE ADULT - SUBJECTIVE AND OBJECTIVE BOX
Patient is a 89y old  Female who presents with a chief complaint of Weakness and Fall, Flu+ve (27 Jan 2025 14:07)    SUBJECTIVE:  BRIEF HOSPITAL COURSE: 89 yo F with pmhx of hypothyroidism and vertigo who presents to [facility] due to fall 2/2 weakness, no head strike or LOC reported. Patient noted that her son at home was sick with flu. RVP was positive for flu. CT revealed increasing size of thoracic aorta, negative for any other acute findings. Labs showed mild THERESE and elevated CPK >400. Patient given IV fluids and tamiflu. UA and Ucx negative. TSH was elevated, free thyroxine within normal limits. Patient evaluated by PT who recommended BETH placement. Pending BETH auth. after completion on Tamiflu 1/29.     OVERNIGHT EVENTS/INTERVAL HPI: No overnight events. patient was evaluated at bedside in the AM. Pt continued on tamiflu and is breathing on RA. Pt states she spoke with pt and agrees with plan for rehab with BETH. Pt denies acute complaints. Son was notified on patient condition and plan for BETH.     MEDICATIONS  (STANDING):  fluticasone propionate 50 MICROgram(s)/spray Nasal Spray 1 Spray(s) Both Nostrils two times a day  heparin   Injectable 5000 Unit(s) SubCutaneous every 12 hours  levothyroxine 75 MICROGram(s) Oral daily  oseltamivir Suspension 30 milliGRAM(s) Oral daily  polyethylene glycol 3350 17 Gram(s) Oral daily    MEDICATIONS  (PRN):  acetaminophen     Tablet .. 650 milliGRAM(s) Oral every 6 hours PRN Temp greater or equal to 38C (100.4F), Mild Pain (1 - 3)  aluminum hydroxide/magnesium hydroxide/simethicone Suspension 30 milliLiter(s) Oral every 4 hours PRN Dyspepsia  meclizine 12.5 milliGRAM(s) Oral daily PRN Dizziness  melatonin 3 milliGRAM(s) Oral at bedtime PRN Insomnia  ondansetron Injectable 4 milliGRAM(s) IV Push every 8 hours PRN Nausea and/or Vomiting      Allergies    No Known Allergies    Intolerances        REVIEW OF SYSTEMS:    CONSTITUTIONAL: No weakness, fevers or chills  EYES/ENT: No visual changes;  No vertigo or throat pain   NECK: No pain or stiffness  RESPIRATORY: No cough, wheezing, hemoptysis; No shortness of breath  CARDIOVASCULAR: No chest pain or palpitations  GASTROINTESTINAL: No abdominal or epigastric pain. No nausea, vomiting, or hematemesis; No diarrhea or constipation. No melena or hematochezia.  GENITOURINARY: No dysuria, frequency or hematuria  NEUROLOGICAL: No numbness or weakness  SKIN: No itching, rashes    OBJECTIVE:  Vital Signs Last 24 Hrs  T(C): 36.6 (28 Jan 2025 08:59), Max: 36.9 (28 Jan 2025 05:19)  T(F): 97.9 (28 Jan 2025 08:59), Max: 98.5 (28 Jan 2025 05:19)  HR: 89 (28 Jan 2025 08:59) (80 - 89)  BP: 147/75 (28 Jan 2025 08:59) (147/75 - 172/68)  BP(mean): 98 (27 Jan 2025 20:00) (98 - 98)  RR: 19 (28 Jan 2025 08:59) (18 - 19)  SpO2: 93% (28 Jan 2025 08:59) (92% - 93%)    Parameters below as of 28 Jan 2025 08:59  Patient On (Oxygen Delivery Method): room air        PHYSICAL EXAM:  GENERAL: no acute distress, comfortably in bed  HEENT: Atraumatic, normocephalic, non-icteric, no JVD  NEURO: No focal deficits, moving all extremities spontaneously, A&Ox3, no dysarthria, CN II-XII grossly intact  PSYCH: Normal affect, calm, appropriate insight and judgment, fluent speech  LUNGS: CTAB, no wrr, non-labored breathing  HEART: RRR, no murmur appreciated  ABD: Soft, non-tender, non-distended, no organomegaly, no appreciable masses, +bs all 4 quadrants  EXTREMITIES: Nontender, no clubbing, cyanosis, or edema  SKIN: No rashes or lesions    LABS:    01-28    132[L]  |  98  |  21.5[H]  ----------------------------<  105[H]  3.5   |  22.0  |  1.03    Ca    8.2[L]      28 Jan 2025 08:33  Phos  2.0     01-28  Mg     1.9     01-28        Urinalysis Basic - ( 28 Jan 2025 08:33 )    Color: x / Appearance: x / SG: x / pH: x  Gluc: 105 mg/dL / Ketone: x  / Bili: x / Urobili: x   Blood: x / Protein: x / Nitrite: x   Leuk Esterase: x / RBC: x / WBC x   Sq Epi: x / Non Sq Epi: x / Bacteria: x      CAPILLARY BLOOD GLUCOSE          RADIOLOGY & ADDITIONAL TESTS:

## 2025-01-29 ENCOUNTER — TRANSCRIPTION ENCOUNTER (OUTPATIENT)
Age: 89
End: 2025-01-29

## 2025-01-29 LAB
ANION GAP SERPL CALC-SCNC: 14 MMOL/L — SIGNIFICANT CHANGE UP (ref 5–17)
BUN SERPL-MCNC: 20.4 MG/DL — HIGH (ref 8–20)
CALCIUM SERPL-MCNC: 8 MG/DL — LOW (ref 8.4–10.5)
CHLORIDE SERPL-SCNC: 101 MMOL/L — SIGNIFICANT CHANGE UP (ref 96–108)
CK MB CFR SERPL CALC: 3.8 NG/ML — SIGNIFICANT CHANGE UP (ref 0–6.7)
CK SERPL-CCNC: 538 U/L — HIGH (ref 25–170)
CO2 SERPL-SCNC: 19 MMOL/L — LOW (ref 22–29)
CREAT SERPL-MCNC: 0.96 MG/DL — SIGNIFICANT CHANGE UP (ref 0.5–1.3)
EGFR: 55 ML/MIN/1.73M2 — LOW
GLUCOSE SERPL-MCNC: 82 MG/DL — SIGNIFICANT CHANGE UP (ref 70–99)
MAGNESIUM SERPL-MCNC: 1.9 MG/DL — SIGNIFICANT CHANGE UP (ref 1.6–2.6)
PHOSPHATE SERPL-MCNC: 4.7 MG/DL — SIGNIFICANT CHANGE UP (ref 2.4–4.7)
POTASSIUM SERPL-MCNC: 3.7 MMOL/L — SIGNIFICANT CHANGE UP (ref 3.5–5.3)
POTASSIUM SERPL-SCNC: 3.7 MMOL/L — SIGNIFICANT CHANGE UP (ref 3.5–5.3)
SODIUM SERPL-SCNC: 134 MMOL/L — LOW (ref 135–145)

## 2025-01-29 PROCEDURE — 99232 SBSQ HOSP IP/OBS MODERATE 35: CPT | Mod: GC

## 2025-01-29 RX ORDER — IBUPROFEN 200 MG
1 CAPSULE ORAL ONCE
Refills: 0 | Status: COMPLETED | OUTPATIENT
Start: 2025-01-29 | End: 2025-01-29

## 2025-01-29 RX ADMIN — ACETAMINOPHEN 650 MILLIGRAM(S): 160 SUSPENSION ORAL at 13:06

## 2025-01-29 RX ADMIN — LEVOTHYROXINE SODIUM 75 MICROGRAM(S): 25 TABLET ORAL at 05:26

## 2025-01-29 RX ADMIN — ACETAMINOPHEN 650 MILLIGRAM(S): 160 SUSPENSION ORAL at 22:15

## 2025-01-29 RX ADMIN — Medication 5000 UNIT(S): at 17:36

## 2025-01-29 RX ADMIN — ACETAMINOPHEN 650 MILLIGRAM(S): 160 SUSPENSION ORAL at 12:06

## 2025-01-29 RX ADMIN — ACETAMINOPHEN, DIPHENHYDRAMINE HCL, PHENYLEPHRINE HCL 3 MILLIGRAM(S): 325; 25; 5 TABLET ORAL at 22:16

## 2025-01-29 RX ADMIN — FLUTICASONE PROPIONATE 1 SPRAY(S): 50 SPRAY, METERED NASAL at 05:26

## 2025-01-29 RX ADMIN — Medication 1 TABLET(S): at 12:00

## 2025-01-29 RX ADMIN — ACETAMINOPHEN 650 MILLIGRAM(S): 160 SUSPENSION ORAL at 23:00

## 2025-01-29 RX ADMIN — ACETAMINOPHEN 650 MILLIGRAM(S): 160 SUSPENSION ORAL at 06:07

## 2025-01-29 RX ADMIN — FLUTICASONE PROPIONATE 1 SPRAY(S): 50 SPRAY, METERED NASAL at 17:35

## 2025-01-29 RX ADMIN — ACETAMINOPHEN 650 MILLIGRAM(S): 160 SUSPENSION ORAL at 05:26

## 2025-01-29 RX ADMIN — Medication 70 MILLILITER(S): at 05:30

## 2025-01-29 RX ADMIN — Medication 5000 UNIT(S): at 05:26

## 2025-01-29 RX ADMIN — OSELTAMIVIR PHOSPHATE 30 MILLIGRAM(S): 75 CAPSULE ORAL at 18:53

## 2025-01-29 NOTE — DISCHARGE NOTE NURSING/CASE MANAGEMENT/SOCIAL WORK - FINANCIAL ASSISTANCE
[facility] provides services at a reduced cost to those who are determined to be eligible through [facility]’s financial assistance program. Information regarding [facility]’s financial assistance program can be found by going to [email or web address] or by calling [phone].

## 2025-01-29 NOTE — DISCHARGE NOTE NURSING/CASE MANAGEMENT/SOCIAL WORK - PATIENT PORTAL LINK FT
You can access the FollowMyHealth Patient Portal offered by [facility] by registering at the following website: [email or web address]. By joining [email or web address]’s FollowMyHealth portal, you will also be able to view your health information using other applications (apps) compatible with our system.

## 2025-01-29 NOTE — PROGRESS NOTE ADULT - ATTENDING COMMENTS
comfortable   wean off o2   c/w tamiflue x 5 days   pt eval   possible dc home in 1-2 days
patient awake , alert, on room air   no fever   denies dizziness   seen by pt , plan for libby   patient agrees for libby   needs auth .
patient awake , alert, on room air   no fever   denies dizziness   seen by pt , plan for libby   patient agrees for libby   needs auth

## 2025-01-29 NOTE — PROGRESS NOTE ADULT - SUBJECTIVE AND OBJECTIVE BOX
SUBJECTIVE    LAST 24 HOURS:  TODAY:  Pt seen at bedside in AM  No acute/overnight events  No acute medical complaints  last dose of tamiflu today at 5pm    OBJECTIVE    PHYSICAL EXAM:  GENERAL: No acute distress, comfortably in bed. elderly women  HEENT: Atraumatic, normocephalic, non-icteric  NEURO: A&Ox3, no focal deficits, moving all extremities spontaneously, no dysarthria, CN II-XII grossly intact  PSYCH: Normal affect, calm, appropriate insight and judgment, fluent speech  LUNGS: CTAB, no wrr, non-labored breathing  HEART: RRR, no murmur appreciated  ABD: Soft, non-tender, non-distended, no organomegaly, no appreciable masses, +bs all 4 quadrants  EXTREMITIES: Nontender, no clubbing, cyanosis, or edema    Vital Signs Last 24 Hrs  T(C): 36.3 (29 Jan 2025 09:23), Max: 36.8 (28 Jan 2025 21:32)  T(F): 97.4 (29 Jan 2025 09:23), Max: 98.2 (28 Jan 2025 21:32)  HR: 72 (29 Jan 2025 09:23) (72 - 76)  BP: 138/71 (29 Jan 2025 09:23) (138/71 - 169/77)  BP(mean): --  RR: 17 (29 Jan 2025 09:23) (17 - 18)  SpO2: 93% (29 Jan 2025 09:23) (93% - 96%)    Parameters below as of 29 Jan 2025 09:23  Patient On (Oxygen Delivery Method): room air    MEDICATIONS  (STANDING):  fluticasone propionate 50 MICROgram(s)/spray Nasal Spray 1 Spray(s) Both Nostrils two times a day  heparin   Injectable 5000 Unit(s) SubCutaneous every 12 hours  levothyroxine 75 MICROGram(s) Oral daily  oseltamivir Suspension 30 milliGRAM(s) Oral daily  polyethylene glycol 3350 17 Gram(s) Oral daily  sodium chloride 0.9%. 1000 milliLiter(s) (70 mL/Hr) IV Continuous <Continuous>    MEDICATIONS  (PRN):  acetaminophen     Tablet .. 650 milliGRAM(s) Oral every 6 hours PRN Temp greater or equal to 38C (100.4F), Mild Pain (1 - 3)  aluminum hydroxide/magnesium hydroxide/simethicone Suspension 30 milliLiter(s) Oral every 4 hours PRN Dyspepsia  meclizine 12.5 milliGRAM(s) Oral daily PRN Dizziness  melatonin 3 milliGRAM(s) Oral at bedtime PRN Insomnia  ondansetron Injectable 4 milliGRAM(s) IV Push every 8 hours PRN Nausea and/or Vomiting    Allergies    No Known Allergies    Intolerances        LABS:    01-29    134[L]  |  101  |  20.4[H]  ----------------------------<  82  3.7   |  19.0[L]  |  0.96    Ca    8.0[L]      29 Jan 2025 06:05  Phos  4.7     01-29  Mg     1.9     01-29        Urinalysis Basic - ( 29 Jan 2025 06:05 )    Color: x / Appearance: x / SG: x / pH: x  Gluc: 82 mg/dL / Ketone: x  / Bili: x / Urobili: x   Blood: x / Protein: x / Nitrite: x   Leuk Esterase: x / RBC: x / WBC x   Sq Epi: x / Non Sq Epi: x / Bacteria: x    CAPILLARY BLOOD GLUCOSE    CULTURE DATA:    Culture - Urine (collected 01-24-25 @ 12:00)  Source: Clean Catch Clean Catch (Midstream)  Final Report (01-25-25 @ 15:04):    >=3 organisms. Probable collection contamination.        RADIOLOGY & ADDITIONAL TESTS:  No new imaging to review

## 2025-01-29 NOTE — DISCHARGE NOTE NURSING/CASE MANAGEMENT/SOCIAL WORK - NSDCFUADDAPPT_GEN_ALL_CORE_FT
[facility] - [address] [place] [id number]. [phone]

## 2025-01-29 NOTE — PROGRESS NOTE ADULT - ASSESSMENT
#Weakness and fall due to Flu  #Rhabdo  - CT imaging negative as above  - S/p I L bolus by ED and tylenol  - Tamiflu 5 day course, last day today  - UA negative  - PT eval recommends BETH placement  - Patient encouraged increase PO    #THERESE likely prerenal in setting of dehydration and poor po intake  - Bun/Creatinine 17.3/1.35 (unknown baseline) In ED -->21.5/1.02 1/28  - Showing improvement     #Hypothyroidism  - TSH 8.92  - C/w levothyroxine 75mcg  - t3 57  - free thyroid 1  - f/u outpatient    #Vertigo  - C/w home meclizine prn    #?Thoracic Aorta Aneurysm  - Seen on xray  - Will need Aortogram outpt    Dvt PPX: Heparin  Diet: regular    Dispo: Pending BETH  Dispo: likely tomorrow 89 yo F with pmhx of hypothyroidism and vertigo who presents to [facility] due to fall 2/2 weakness, no head strike or LOC reported.  RVP was positive for flu. CT revealed increasing size of thoracic aorta, negative for any other acute findings. Labs showed mild THERESE / rhabdo.  >400. TSH was elevated, free thyroxine within normal limits. Patient evaluated by PT who recommended BETH placement. Pending BETH auth.    #Weakness and fall due to Flu  #Rhabdo  - CT imaging negative as above  - S/p I L bolus by ED and tylenol  - Tamiflu 5 day course, last day today  - UA negative  - PT eval recommends BETH placement  - Patient encouraged increase PO    #THERESE likely prerenal in setting of dehydration and poor po intake  - Bun/Creatinine 17.3/1.35 (unknown baseline) In ED -->21.5/1.02 1/28  - Showing improvement     #Hypothyroidism  - TSH 8.92  - C/w levothyroxine 75mcg  - t3 57  - free thyroid 1  - f/u outpatient    #Vertigo  - C/w home meclizine prn    #?Thoracic Aorta Aneurysm  - Seen on xray  - Will need Aortogram outpt    Dvt PPX: Heparin  Diet: regular    Dispo: Pending BETH  Dispo: likely tomorrow

## 2025-01-29 NOTE — PROGRESS NOTE ADULT - REASON FOR ADMISSION
Weakness and Fall, Flu+ve

## 2025-01-30 VITALS
RESPIRATION RATE: 16 BRPM | DIASTOLIC BLOOD PRESSURE: 68 MMHG | TEMPERATURE: 98 F | OXYGEN SATURATION: 95 % | HEART RATE: 82 BPM | SYSTOLIC BLOOD PRESSURE: 166 MMHG

## 2025-01-30 PROCEDURE — 99285 EMERGENCY DEPT VISIT HI MDM: CPT

## 2025-01-30 PROCEDURE — 82553 CREATINE MB FRACTION: CPT

## 2025-01-30 PROCEDURE — 84439 ASSAY OF FREE THYROXINE: CPT

## 2025-01-30 PROCEDURE — 80053 COMPREHEN METABOLIC PANEL: CPT

## 2025-01-30 PROCEDURE — 84484 ASSAY OF TROPONIN QUANT: CPT

## 2025-01-30 PROCEDURE — 71045 X-RAY EXAM CHEST 1 VIEW: CPT

## 2025-01-30 PROCEDURE — 93005 ELECTROCARDIOGRAM TRACING: CPT

## 2025-01-30 PROCEDURE — 87086 URINE CULTURE/COLONY COUNT: CPT

## 2025-01-30 PROCEDURE — 99239 HOSP IP/OBS DSCHRG MGMT >30: CPT | Mod: GC

## 2025-01-30 PROCEDURE — 81001 URINALYSIS AUTO W/SCOPE: CPT

## 2025-01-30 PROCEDURE — 94640 AIRWAY INHALATION TREATMENT: CPT

## 2025-01-30 PROCEDURE — 36415 COLL VENOUS BLD VENIPUNCTURE: CPT

## 2025-01-30 PROCEDURE — 85025 COMPLETE CBC W/AUTO DIFF WBC: CPT

## 2025-01-30 PROCEDURE — 85610 PROTHROMBIN TIME: CPT

## 2025-01-30 PROCEDURE — 83036 HEMOGLOBIN GLYCOSYLATED A1C: CPT

## 2025-01-30 PROCEDURE — 85730 THROMBOPLASTIN TIME PARTIAL: CPT

## 2025-01-30 PROCEDURE — 87637 SARSCOV2&INF A&B&RSV AMP PRB: CPT

## 2025-01-30 PROCEDURE — 80061 LIPID PANEL: CPT

## 2025-01-30 PROCEDURE — 80048 BASIC METABOLIC PNL TOTAL CA: CPT

## 2025-01-30 PROCEDURE — 70450 CT HEAD/BRAIN W/O DYE: CPT | Mod: MC

## 2025-01-30 PROCEDURE — 84100 ASSAY OF PHOSPHORUS: CPT

## 2025-01-30 PROCEDURE — 72125 CT NECK SPINE W/O DYE: CPT | Mod: MC

## 2025-01-30 PROCEDURE — 84443 ASSAY THYROID STIM HORMONE: CPT

## 2025-01-30 PROCEDURE — 96374 THER/PROPH/DIAG INJ IV PUSH: CPT

## 2025-01-30 PROCEDURE — 84480 ASSAY TRIIODOTHYRONINE (T3): CPT

## 2025-01-30 PROCEDURE — 72190 X-RAY EXAM OF PELVIS: CPT

## 2025-01-30 PROCEDURE — 72131 CT LUMBAR SPINE W/O DYE: CPT | Mod: MC

## 2025-01-30 PROCEDURE — 83735 ASSAY OF MAGNESIUM: CPT

## 2025-01-30 PROCEDURE — 82550 ASSAY OF CK (CPK): CPT

## 2025-01-30 RX ORDER — ACETAMINOPHEN 160 MG/5ML
2 SUSPENSION ORAL
Qty: 120 | Refills: 0
Start: 2025-01-30 | End: 2025-02-13

## 2025-01-30 RX ORDER — AMLODIPINE BESYLATE 5 MG
1 TABLET ORAL
Qty: 0 | Refills: 0 | DISCHARGE
Start: 2025-01-30

## 2025-01-30 RX ORDER — AMLODIPINE BESYLATE 5 MG
5 TABLET ORAL DAILY
Refills: 0 | Status: DISCONTINUED | OUTPATIENT
Start: 2025-01-30 | End: 2025-01-30

## 2025-01-30 RX ORDER — AMLODIPINE BESYLATE 5 MG
10 TABLET ORAL DAILY
Refills: 0 | Status: DISCONTINUED | OUTPATIENT
Start: 2025-01-30 | End: 2025-01-30

## 2025-01-30 RX ORDER — FLUTICASONE PROPIONATE 50 UG/1
1 SPRAY, METERED NASAL
Qty: 1 | Refills: 0
Start: 2025-01-30 | End: 2025-02-28

## 2025-01-30 RX ORDER — POLYETHYLENE GLYCOL 3350 17 G/17G
17 POWDER, FOR SOLUTION ORAL
Qty: 1 | Refills: 0
Start: 2025-01-30 | End: 2025-02-28

## 2025-01-30 RX ADMIN — Medication 5 MILLIGRAM(S): at 09:16

## 2025-01-30 RX ADMIN — Medication 5000 UNIT(S): at 05:58

## 2025-01-30 RX ADMIN — ACETAMINOPHEN 650 MILLIGRAM(S): 160 SUSPENSION ORAL at 05:58

## 2025-01-30 RX ADMIN — ACETAMINOPHEN 650 MILLIGRAM(S): 160 SUSPENSION ORAL at 06:17

## 2025-01-30 RX ADMIN — Medication 70 MILLILITER(S): at 05:58

## 2025-01-30 RX ADMIN — LEVOTHYROXINE SODIUM 75 MICROGRAM(S): 25 TABLET ORAL at 05:57

## 2025-01-30 RX ADMIN — FLUTICASONE PROPIONATE 1 SPRAY(S): 50 SPRAY, METERED NASAL at 06:02
